# Patient Record
Sex: FEMALE | Race: WHITE | NOT HISPANIC OR LATINO | Employment: PART TIME | URBAN - METROPOLITAN AREA
[De-identification: names, ages, dates, MRNs, and addresses within clinical notes are randomized per-mention and may not be internally consistent; named-entity substitution may affect disease eponyms.]

---

## 2017-02-28 ENCOUNTER — ALLSCRIPTS OFFICE VISIT (OUTPATIENT)
Dept: OTHER | Facility: OTHER | Age: 27
End: 2017-02-28

## 2017-12-19 ENCOUNTER — ALLSCRIPTS OFFICE VISIT (OUTPATIENT)
Dept: OTHER | Facility: OTHER | Age: 27
End: 2017-12-19

## 2017-12-20 NOTE — PROGRESS NOTES
Assessment  1  Infection, upper respiratory (465 9) (J06 9)    Plan  Infection, upper respiratory    · Albuterol Sulfate (2 5 MG/3ML) 0 083% Inhalation Nebulization Solution; USE ONEVIAL IN NEBULIZER EVERY 4 TO 6 HOURS AS NEEDED FOR COUGH  ANDWHEEZE   · Azithromycin 250 MG Oral Tablet; TAKE 2 TABLETS BY MOUTH ON DAY 1, THENTAKE 1 TABLET BY MOUTH DAILY FOR 4 DAYS   · Cheratussin -10 MG/5ML Oral Syrup; TAKE 5 - 10 ML EVERY 4 TO 6HOURS AS NEEDED FOR COUGH   · PredniSONE 10 MG Oral Tablet; 4 tabs po qd for 2 days,then 3 tabs po qd for 2days,then 2 tabs poqd for 2 days,then 1 tabpo qd for 2 days    Discussion/Summary    Patient presents for evaluation of upper respiratory infection  Exam is consistent with acute sinusitis, she has been experiencing symptoms of mild bronchospasm relieved by albuterol via nebulizer  Will start patient on Zithromax Z-Eliseo, she will continue albuterol nebulizer as needed  She will use Robitussin with codeine as needed for cough  If her symptoms of chest tightness and or sinus pressure will not improve significantly in 48-72 hours, she will fill written a prescription for prednisone that I have provided for her today  The patient was counseled regarding instructions for management,-- impressions  Possible side effects of new medications were reviewed with the patient/guardian today  The treatment plan was reviewed with the patient/guardian  The patient/guardian understands and agrees with the treatment plan      Chief Complaint  Pt is here w/ c/o cough, runny nose, chest congestion since Friday  Pt states she had a fever on Saturday   All meds/allergies reviewed and updated      History of Present Illness  HPI: cold s/o for 4 days chest tightness - loser now, uses nebulizer with albuterol at home- helped quite a bit fever over the weekend, resolvedPatient is complaining of persistent sinus pressure, cough , nasal congestion and sore throat       Review of Systems   Constitutional: feeling poorly-- and-- feeling tired, but-- as noted in HPI   ENT: sore throat-- and-- nasal discharge, but-- as noted in HPI  Cardiovascular: no complaints of slow or fast heart rate, no chest pain, no palpitations, no leg claudication or lower extremity edema  Respiratory: shortness of breath-- and-- cough, but-- as noted in HPI  Neurological: no complaints of headache, no confusion, no numbness or tingling, no dizziness or fainting  Active Problems  1  Cold sore (054 9) (B00 1)   2  Infection, upper respiratory (465 9) (J06 9)   3  Urinary tract infection (599 0) (N39 0)    Past Medical History  Active Problems And Past Medical History Reviewed: The active problems and past medical history were reviewed and updated today  Family History  Mother    1  Family history of Depression   2  Family history of Hypertension   3  Family history of Living and Healthy  Father    4  Family history of Living and Healthy  Family History Reviewed: The family history was reviewed and updated today  Social History   · Never A Smoker  The social history was reviewed and updated today  Surgical History  1  History of Cholecystectomy  Surgical History Reviewed: The surgical history was reviewed and updated today  Current Meds   1  ValACYclovir HCl - 1 GM Oral Tablet; take 2 tablets twice a day as needed for cold sore; Therapy: [id number] to (Last Rx:[id number])  Requested for: [id number] Ordered    The medication list was reviewed and updated today  Allergies  1  No Known Drug Allergies    Vitals   Recorded: [id number] 11:24AM   Temperature 97 5 F   Heart Rate 80   Respiration 16   Systolic 757   Diastolic 70   Height 4 ft 8 5 in   Weight 101 lb 6 oz   BMI Calculated 22 33   BSA Calculated 1 34     Physical Exam   Constitutional  General appearance: No acute distress, well appearing and well nourished     Ears, Nose, Mouth, and Throat  Otoscopic examination: Tympanic membranes translucent with normal light reflex  Canals patent without erythema  Nasal mucosa, septum, and turbinates: Abnormal   The bilateral nasal mucosa was boggy-- and-- edematous  Oropharynx: Abnormal  -- cobblestone, erythema, postnasal drip , no exudate  Pulmonary  Respiratory effort: No increased work of breathing or signs of respiratory distress  Auscultation of lungs: Clear to auscultation  Cardiovascular  Auscultation of heart: Normal rate and rhythm, normal S1 and S2, without murmurs  Lymphatic  Palpation of lymph nodes in neck: Abnormal   bilateral submandibular node enlargement  Musculoskeletal  Gait and station: Normal    Neurologic  Cranial nerves: Cranial nerves 2-12 intact  Psychiatric  Orientation to person, place, and time: Normal    Mood and affect: Normal          Signatures   Electronically signed by :  SOO Lantigua ; Dec 19 2017  8:04PM EST                       (Author)

## 2018-01-15 VITALS
HEART RATE: 68 BPM | SYSTOLIC BLOOD PRESSURE: 102 MMHG | WEIGHT: 99.25 LBS | DIASTOLIC BLOOD PRESSURE: 72 MMHG | TEMPERATURE: 97.6 F | BODY MASS INDEX: 21.41 KG/M2 | HEIGHT: 57 IN

## 2018-01-22 VITALS
HEIGHT: 57 IN | SYSTOLIC BLOOD PRESSURE: 110 MMHG | BODY MASS INDEX: 21.87 KG/M2 | DIASTOLIC BLOOD PRESSURE: 70 MMHG | WEIGHT: 101.38 LBS | HEART RATE: 80 BPM | RESPIRATION RATE: 16 BRPM | TEMPERATURE: 97.5 F

## 2018-01-23 NOTE — MISCELLANEOUS
Message  Patient is excused from work 12/19/17-12/20/17  Return to work or school:   Daniel Manuel is under my professional care  She was seen in my office on 12/19/17             Signatures   Electronically signed by :  SOO Garcias ; Dec 19 2017  4:45PM EST                       (Author)

## 2018-03-22 ENCOUNTER — OFFICE VISIT (OUTPATIENT)
Dept: FAMILY MEDICINE CLINIC | Facility: CLINIC | Age: 28
End: 2018-03-22

## 2018-03-22 VITALS
HEART RATE: 80 BPM | HEIGHT: 57 IN | SYSTOLIC BLOOD PRESSURE: 98 MMHG | RESPIRATION RATE: 16 BRPM | TEMPERATURE: 97.5 F | WEIGHT: 103.6 LBS | DIASTOLIC BLOOD PRESSURE: 60 MMHG | BODY MASS INDEX: 22.35 KG/M2

## 2018-03-22 DIAGNOSIS — J01.90 ACUTE SINUSITIS, RECURRENCE NOT SPECIFIED, UNSPECIFIED LOCATION: Primary | ICD-10-CM

## 2018-03-22 PROCEDURE — 99213 OFFICE O/P EST LOW 20 MIN: CPT | Performed by: FAMILY MEDICINE

## 2018-03-22 RX ORDER — AMOXICILLIN 875 MG/1
875 TABLET, COATED ORAL 2 TIMES DAILY
Qty: 20 TABLET | Refills: 0 | Status: SHIPPED | OUTPATIENT
Start: 2018-03-22 | End: 2018-04-01

## 2018-03-22 RX ORDER — PREDNISONE 10 MG/1
TABLET ORAL
Qty: 20 TABLET | Refills: 0 | Status: SHIPPED | OUTPATIENT
Start: 2018-03-22 | End: 2018-07-19

## 2018-03-22 RX ORDER — ALBUTEROL SULFATE 2.5 MG/3ML
1 SOLUTION RESPIRATORY (INHALATION)
COMMUNITY
Start: 2017-12-19 | End: 2019-01-28 | Stop reason: SDUPTHER

## 2018-03-22 NOTE — PATIENT INSTRUCTIONS
Please discontinue DayQuil and NyQuil as you will be using prednisone to relieve you nasal congestion and headache   You may use Tylenol as needed but please do not take ibuprofen while you on prednisone   Please use amoxicillin 1 tablet twice a day for the next 7-10 days   Please gargle, rest, force fluids

## 2018-03-22 NOTE — PROGRESS NOTES
FAMILY PRACTICE OFFICE VISIT       NAME: Dori Chase  AGE: 28 y o  SEX: female       : 1990        MRN: [id number]    DATE: 3/22/2018  TIME: 10:13 PM    Assessment and Plan     Problem List Items Addressed This Visit     None      Visit Diagnoses     Acute sinusitis, recurrence not specified, unspecified location    -  Primary    Relevant Medications    amoxicillin (AMOXIL) 875 mg tablet    predniSONE 10 mg tablet       Patient presents for evaluation of acute sinusitis  She is experiencing significant maxillary pressure, cervical lymphadenopathy, sore throat and fatigue  Will treat with amoxicillin prednisone taper  Patient will use 40 mg daily for 2 days then 30 mg daily for 2 days then 20 mg daily for 2 days then 10 mg daily for 2 days  Advised rest, fluids, patient will contact me in a few days if her symptoms are not improving significantly  Patient Instructions    Please discontinue DayQuil and NyQuil as you will be using prednisone to relieve you nasal congestion and headache   You may use Tylenol as needed but please do not take ibuprofen while you on prednisone   Please use amoxicillin 1 tablet twice a day for the next 7-10 days   Please gargle, rest, force fluids          Chief Complaint     Chief Complaint   Patient presents with    Sore Throat    Nasal Congestion    Fever    Generalized Body Aches    Cough       History of Present Illness     ST  Since Tuesday,3/20   fever later that day    Now c/o  Wet/dry  Cough , along with significant and quite bothersome sinus pressure/worse at left maxillary sinus    No earache  No chest tightness  Tension HA    No fever  Used DayQuil and NyQuil   Mild cough, no chest tightness or wheezing  Sore Throat    Associated symptoms include congestion, coughing, ear pain and headaches  Pertinent negatives include no shortness of breath     Generalized Body Aches   Associated symptoms include congestion, ear pain, headaches, a sore throat, fatigue, a fever and coughing  Pertinent negatives include no shortness of breath or wheezing  Cough   Associated symptoms include ear pain, a fever, headaches and a sore throat  Pertinent negatives include no shortness of breath or wheezing  Review of Systems   Review of Systems   Constitutional: Positive for activity change, fatigue and fever  HENT: Positive for congestion, ear pain, sinus pressure and sore throat  Respiratory: Positive for cough  Negative for chest tightness, shortness of breath and wheezing  Cardiovascular: Negative  Gastrointestinal: Negative  Genitourinary: Negative  Musculoskeletal: Negative  Neurological: Positive for headaches  Hematological: Negative  Psychiatric/Behavioral: Negative  Active Problem List   There is no problem list on file for this patient  Past Medical History:  Past Medical History:   Diagnosis Date    Asthma        Past Surgical History:  History reviewed  No pertinent surgical history  Family History:  Family History   Problem Relation Age of Onset    Depression Mother     Hypertension Mother        Social History:  Social History     Social History    Marital status: Single     Spouse name: N/A    Number of children: N/A    Years of education: N/A     Occupational History    Not on file  Social History Main Topics    Smoking status: Former Smoker    Smokeless tobacco: Never Used    Alcohol use No    Drug use: No    Sexual activity: Not on file     Other Topics Concern    Not on file     Social History Narrative    No narrative on file       Objective     Vitals:    03/22/18 1639   BP: 98/60   Pulse: 80   Resp: 16   Temp: 97 5 °F (36 4 °C)     Wt Readings from Last 3 Encounters:   03/22/18 47 kg (103 lb 9 6 oz)   12/19/17 46 kg (101 lb 6 1 oz)   02/28/17 45 kg (99 lb 4 oz)       Physical Exam   Constitutional: She appears well-developed and well-nourished  She has a sickly appearance     HENT:   Head: Normocephalic and atraumatic  Right Ear: Tympanic membrane normal  Tympanic membrane is not erythematous  Left Ear: Tympanic membrane is not erythematous  A middle ear effusion is present  Nose: Mucosal edema present  Left sinus exhibits maxillary sinus tenderness  Eyes: Conjunctivae are normal    Cardiovascular: Normal rate and normal heart sounds  No murmur heard  Pulmonary/Chest: Effort normal and breath sounds normal  No respiratory distress  She has no wheezes  She has no rales  Musculoskeletal: Normal range of motion  Lymphadenopathy:     She has cervical adenopathy (submandibular)  Nursing note and vitals reviewed        Pertinent Laboratory/Diagnostic Studies:  No results found for: GLUCOSE, BUN, CREATININE, CALCIUM, NA, K, CO2, CL  No results found for: ALT, AST, GGT, ALKPHOS, BILITOT    No results found for: WBC, HGB, HCT, MCV, PLT    No results found for: TSH    No results found for: CHOL  No results found for: TRIG  No results found for: HDL  No results found for: LDLCALC  No results found for: HGBA1C    Results for orders placed or performed in visit on 10/08/15   URINALYSIS W/CULTURE & SENSI REFLEX (HISTORICAL)   Result Value Ref Range    Color, UA Dk Yellow     Clarity, UA Turbid     Specific [place], UA 1 020 1 003 - 1 030    pH, UA 6 0 4 5 - 8 0    Glucose, UA Negative Negative mg/dL    Ketones, UA Negative Negative mg/dL    Blood, UA Large (A) Negative    Protein,  (2+) (A) Negative mg/dL    Nitrite, UA Negative Negative    Bilirubin, UA Negative Negative    Urobilinogen, UA 0 2 0 2,1 0 E U /dl    Leukocytes, UA Large (A) Negative   URINE MICROSCOPIC (HISTORICAL)   Result Value Ref Range    RBC, UA Innumerable (A) None Seen,2-4 /hpf    WBC, UA Innumerable (A) None Seen,2-4 /hpf    Epithelial Cells None Seen None Seen /hpf    Bacteria, UA Innumerable (A) None Seen,Occasional /hpf   CULT - URINE (HISTORICAL)   Result Value Ref Range    Microbiology Specimen: Urine Clean Catch Microbiology Collected: 10/08/2015 07:17     Microbiology       Microbiology       Status: Final      Last Updated: 10/10/2015 09:09          Microbiology       Microbiology       Microbiology   CULT RSLT URINE (Final)     Microbiology     [place] Count >100,000 CFU/mL ISOLATE 1     Microbiology       Microbiology   ISOLATE 1 (Final)     Microbiology     Escherichia coli     Microbiology       Microbiology                           ISOLATE 1     Microbiology                           Escherichia coli     Microbiology     Antibiotics           -------------------     Microbiology                           mcg/mL   Interp     Microbiology     AMP/SULBACTAM         >16/8     R     Microbiology     AMPICILLIN            >16       R     Microbiology     AZTREONAM             <=8       S     Microbiology     CEFAZOLIN             <=8       S     Microbiology     CIPROFLOXACIN         <=1       S     Microbiology     GENTAMICIN            <=4       S     Microbiology     LEVOFLOXACIN          <=2       S     Microbiology     NITROFURANTOIN        <=32      S     Microbiology     PIPERACILLIN/TAZOBAC  <=16      S     Microbiology     TETRACYCLINE          <=4       S     Microbiology     TOBRAMYCIN            <=4       S     Microbiology     TRIMETHOPRIM/SULFA    <=2/38    S     Microbiology       Microbiology Interpretation Table:     Microbiology       Microbiology    Code   Description     Microbiology    ---    -----------     Microbiology    R      Resistant     Microbiology    S      Susceptible     Microbiology       Microbiology          No orders of the defined types were placed in this encounter        ALLERGIES:  No Known Allergies    Current Medications     Current Outpatient Prescriptions   Medication Sig Dispense Refill    albuterol (2 5 mg/3 mL) 0 083 % nebulizer solution Inhale 1 vial      amoxicillin (AMOXIL) 875 mg tablet Take 1 tablet (875 mg total) by mouth 2 (two) times a day for 10 days 20 tablet 0  predniSONE 10 mg tablet Take 40 mg once a day for 2 days then 30 mg once a day for 2 days then 20 mg once a day for 2 days then 10 mg once a day for 2 days 20 tablet 0     No current facility-administered medications for this visit  Health Maintenance   There are no preventive care reminders to display for this patient  There is no immunization history on file for this patient      Kaci Appiah MD

## 2018-04-02 DIAGNOSIS — Z00.00 HEALTHCARE MAINTENANCE: Primary | ICD-10-CM

## 2018-04-02 RX ORDER — VALACYCLOVIR HYDROCHLORIDE 1 G/1
TABLET, FILM COATED ORAL
Qty: 20 TABLET | Refills: 0 | Status: SHIPPED | OUTPATIENT
Start: 2018-04-02 | End: 2019-01-14 | Stop reason: SDUPTHER

## 2018-04-02 NOTE — TELEPHONE ENCOUNTER
Pt is requesting an rx for Valtrex 1 gram tablet  Take  2 tabs bid prn q=30   I do not see on med list  Please advise thanks

## 2018-04-02 NOTE — TELEPHONE ENCOUNTER
rx sent  I will send as 1 tablet t i d  for 7 days      She can use as 2 tablets twice a day for 1 day

## 2018-07-19 ENCOUNTER — OFFICE VISIT (OUTPATIENT)
Dept: FAMILY MEDICINE CLINIC | Facility: CLINIC | Age: 28
End: 2018-07-19
Payer: COMMERCIAL

## 2018-07-19 VITALS
WEIGHT: 99.8 LBS | RESPIRATION RATE: 16 BRPM | DIASTOLIC BLOOD PRESSURE: 66 MMHG | SYSTOLIC BLOOD PRESSURE: 94 MMHG | HEIGHT: 57 IN | TEMPERATURE: 97.9 F | BODY MASS INDEX: 21.53 KG/M2 | HEART RATE: 78 BPM

## 2018-07-19 DIAGNOSIS — G44.209 TENSION HEADACHE: ICD-10-CM

## 2018-07-19 DIAGNOSIS — N39.0 ACUTE UTI: Primary | ICD-10-CM

## 2018-07-19 PROCEDURE — 99213 OFFICE O/P EST LOW 20 MIN: CPT | Performed by: FAMILY MEDICINE

## 2018-07-19 RX ORDER — CIPROFLOXACIN 500 MG/1
500 TABLET, FILM COATED ORAL 2 TIMES DAILY
Qty: 14 TABLET | Refills: 0 | Status: SHIPPED | OUTPATIENT
Start: 2018-07-19 | End: 2018-07-26

## 2018-07-19 NOTE — PROGRESS NOTES
FAMILY PRACTICE OFFICE VISIT       NAME: Miriam Chase  AGE: 28 y o  SEX: female       : 1990        MRN: [id number]    DATE: 2018  TIME: 9:03 PM    Assessment and Plan     Problem List Items Addressed This Visit     None      Visit Diagnoses     Acute UTI    -  Primary    Relevant Medications    ciprofloxacin (CIPRO) 500 mg tablet    Tension headache            Patient presents for evaluation of recurrent urinary tract infection symptoms  She was seen at urgent care center with the onset of dysuria, mild gross hematuria and has started Bactrim DS  Antibiotic worked well for symptoms of UTI but unfortunately has triggered tension-type headaches  Patient has self discontinued antibiotic therapy a day ago  Her headaches have improved significantly but unfortunately she has redeveloped dysuria  She currently denies fever, flank pain or hematuria  Patient is afebrile  She has been using over-the-counter medications so we are not able to reliably test her urine today  At this time I advised her to discontinue Bactrim completely and switch therapy to Cipro 500 mg b i d   Patient will complete 7 days of antibiotic and will bring urinalysis for recheck  I strongly advised her to contact me in a few days if symptoms of dysuria or headaches are not improving  I advised patient to drink plenty of fluids and rest     There are no Patient Instructions on file for this visit  Recheck  U/a next week      Chief Complaint     Chief Complaint   Patient presents with    Urinary Tract Infection     Patient c/o pain and burning with urination and urinary frequency x's 1 wk  History of Present Illness     Patient presents for evaluation of dysuria and headaches  Remote history of kidney stones  Reportedly, she was seen at [address] [place] [address]  On  and has  started on bactrim due to UTI symptoms    Patient's u/a at [facility] reportedly revealed  Microscopic hematuria  Patient reports pinkish urine with onset of s/o  No flank pain or   No N/V  Patient states that Bactrim DS has  worked well for symptoms of UTI but reportedly patient developed headaches with start of antibiotic tfeverherapy  HA is occipital and frontal - tension type, no associated photophobia, phonophobia or dizziness  No visual changes  Patient has discontinued Bactrim 24 hr ago, her headaches have improved significantly while of medication  Unfortunately she Re developed symptoms of dysuria within past 24 hr   Patient denies symptoms of suprapubic pressure  Dysuria : yes  Incomplete emptying : Yes  She denies nausea, vomiting, fever or flank pain  Urinary Tract Infection    Associated symptoms include frequency  Pertinent negatives include no flank pain  Review of Systems   Review of Systems   Constitutional: Positive for fatigue and fever  Respiratory: Negative  Cardiovascular: Negative  Gastrointestinal: Negative  Genitourinary: Positive for dysuria and frequency  Negative for flank pain and pelvic pain  Neurological: Positive for headaches (Reportedly triggered by Bactrim therapy, improved while off antibiotic for 24 hr)  Hematological: Negative  Psychiatric/Behavioral: Negative  Active Problem List   There is no problem list on file for this patient  Past Medical History:  Past Medical History:   Diagnosis Date    Asthma        Past Surgical History:  Past Surgical History:   Procedure Laterality Date    CHOLECYSTECTOMY         Family History:  Family History   Problem Relation Age of Onset    Depression Mother     Hypertension Mother     No Known Problems Father        Social History:  Social History     Social History    Marital status: Single     Spouse name: N/A    Number of children: N/A    Years of education: N/A     Occupational History    Not on file       Social History Main Topics    Smoking status: Former Smoker    Smokeless tobacco: Never Used      Comment: never a smoker, per [employer]    Alcohol use No    Drug use: No    Sexual activity: Not on file     Other Topics Concern    Not on file     Social History Narrative    No narrative on file       Objective     Vitals:    07/19/18 1551   BP: 94/66   Pulse: 78   Resp: 16   Temp: 97 9 °F (36 6 °C)     Wt Readings from Last 3 Encounters:   07/19/18 45 3 kg (99 lb 12 8 oz)   03/22/18 47 kg (103 lb 9 6 oz)   12/19/17 46 kg (101 lb 6 1 oz)       Physical Exam   Constitutional: She is oriented to person, place, and time  She appears well-developed and well-nourished  HENT:   Head: Normocephalic and atraumatic  Eyes: Conjunctivae are normal    Neck: Neck supple  Carotid bruit is not present  No thyromegaly present  Cardiovascular: Normal rate, regular rhythm and normal heart sounds  Pulmonary/Chest: Effort normal and breath sounds normal    Abdominal: Soft  Normal appearance and bowel sounds are normal  She exhibits no distension and no abdominal bruit  There is tenderness in the right lower quadrant and suprapubic area  There is no rigidity, no rebound, no guarding and no CVA tenderness  Musculoskeletal: Normal range of motion  She exhibits no edema  Neurological: She is alert and oriented to person, place, and time  No cranial nerve deficit  Psychiatric: She has a normal mood and affect  Her behavior is normal    Nursing note and vitals reviewed        Pertinent Laboratory/Diagnostic Studies:  No results found for: GLUCOSE, BUN, CREATININE, CALCIUM, NA, K, CO2, CL  No results found for: ALT, AST, GGT, ALKPHOS, BILITOT    No results found for: WBC, HGB, HCT, MCV, PLT    No results found for: TSH    No results found for: CHOL  No results found for: TRIG  No results found for: HDL  No results found for: LDLCALC  No results found for: HGBA1C    Results for orders placed or performed in visit on 10/08/15   URINALYSIS W/CULTURE & SENSI REFLEX (HISTORICAL)   Result Value Ref Range    Color, UA Dk Yellow     Clarity, UA Turbid Specific [place], UA 1 020 1 003 - 1 030    pH, UA 6 0 4 5 - 8 0    Glucose, UA Negative Negative mg/dL    Ketones, UA Negative Negative mg/dL    Blood, UA Large (A) Negative    Protein,  (2+) (A) Negative mg/dL    Nitrite, UA Negative Negative    Bilirubin, UA Negative Negative    Urobilinogen, UA 0 2 0 2,1 0 E U /dl    Leukocytes, UA Large (A) Negative   URINE MICROSCOPIC (HISTORICAL)   Result Value Ref Range    RBC, UA Innumerable (A) None Seen,2-4 /hpf    WBC, UA Innumerable (A) None Seen,2-4 /hpf    Epithelial Cells None Seen None Seen /hpf    Bacteria, UA Innumerable (A) None Seen,Occasional /hpf   CULT - URINE (HISTORICAL)   Result Value Ref Range    Microbiology Specimen: Urine Clean Catch     Microbiology Collected: 10/08/2015 07:17     Microbiology       Microbiology       Status: Final      Last Updated: 10/10/2015 09:09          Microbiology       Microbiology       Microbiology   CULT RSLT URINE (Final)     Microbiology     [place] Count >100,000 CFU/mL ISOLATE 1     Microbiology       Microbiology   ISOLATE 1 (Final)     Microbiology     Escherichia coli     Microbiology       Microbiology                           ISOLATE 1     Microbiology                           Escherichia coli     Microbiology     Antibiotics           -------------------     Microbiology                           mcg/mL   Interp     Microbiology     AMP/SULBACTAM         >16/8     R     Microbiology     AMPICILLIN            >16       R     Microbiology     AZTREONAM             <=8       S     Microbiology     CEFAZOLIN             <=8       S     Microbiology     CIPROFLOXACIN         <=1       S     Microbiology     GENTAMICIN            <=4       S     Microbiology     LEVOFLOXACIN          <=2       S     Microbiology     NITROFURANTOIN        <=32      S     Microbiology     PIPERACILLIN/TAZOBAC  <=16      S     Microbiology     TETRACYCLINE          <=4       S     Microbiology     TOBRAMYCIN            <=4 S     Microbiology     TRIMETHOPRIM/SULFA    <=2/38    S     Microbiology       Microbiology Interpretation Table:     Microbiology       Microbiology    Code   Description     Microbiology    ---    -----------     Microbiology    R      Resistant     Microbiology    S      Susceptible     Microbiology       Microbiology          No orders of the defined types were placed in this encounter  ALLERGIES:  Allergies   Allergen Reactions    Bactrim [Sulfamethoxazole-Trimethoprim] Headache and Drowsiness       Current Medications     Current Outpatient Prescriptions   Medication Sig Dispense Refill    albuterol (2 5 mg/3 mL) 0 083 % nebulizer solution Inhale 1 vial      ciprofloxacin (CIPRO) 500 mg tablet Take 1 tablet (500 mg total) by mouth 2 (two) times a day for 7 days 14 tablet 0    valACYclovir (VALTREX) 1,000 mg tablet Take 1 tablet 3 times a day 20 tablet 0     No current facility-administered medications for this visit  Health Maintenance     Health Maintenance   Topic Date Due    HIV SCREENING  1990    Depression Screening PHQ-9  1990    PNEUMOCOCCAL POLYSACCHARIDE VACCINE AGE 2-64 HIGH RISK  08/02/1991    DTaP,Tdap,and Td Vaccines (1 - Tdap) 08/02/2010    INFLUENZA VACCINE  09/01/2018       There is no immunization history on file for this patient      Hudson Sacks, MD

## 2018-10-02 ENCOUNTER — OFFICE VISIT (OUTPATIENT)
Dept: FAMILY MEDICINE CLINIC | Facility: CLINIC | Age: 28
End: 2018-10-02
Payer: COMMERCIAL

## 2018-10-02 VITALS
SYSTOLIC BLOOD PRESSURE: 102 MMHG | HEART RATE: 62 BPM | RESPIRATION RATE: 16 BRPM | TEMPERATURE: 97.3 F | BODY MASS INDEX: 21.87 KG/M2 | DIASTOLIC BLOOD PRESSURE: 62 MMHG | WEIGHT: 101.4 LBS | HEIGHT: 57 IN

## 2018-10-02 DIAGNOSIS — Z00.00 HEALTHCARE MAINTENANCE: ICD-10-CM

## 2018-10-02 DIAGNOSIS — J30.9 ALLERGIC RHINITIS, UNSPECIFIED SEASONALITY, UNSPECIFIED TRIGGER: ICD-10-CM

## 2018-10-02 DIAGNOSIS — J01.90 ACUTE SINUSITIS, RECURRENCE NOT SPECIFIED, UNSPECIFIED LOCATION: Primary | ICD-10-CM

## 2018-10-02 PROCEDURE — 1036F TOBACCO NON-USER: CPT | Performed by: FAMILY MEDICINE

## 2018-10-02 PROCEDURE — 99213 OFFICE O/P EST LOW 20 MIN: CPT | Performed by: FAMILY MEDICINE

## 2018-10-02 RX ORDER — AMOXICILLIN AND CLAVULANATE POTASSIUM 875; 125 MG/1; MG/1
1 TABLET, FILM COATED ORAL
Qty: 20 TABLET | Refills: 0 | Status: SHIPPED | OUTPATIENT
Start: 2018-10-02 | End: 2018-10-12

## 2018-10-02 RX ORDER — CETIRIZINE HYDROCHLORIDE 10 MG/1
10 TABLET ORAL DAILY
Qty: 30 TABLET | Refills: 5 | Status: SHIPPED | OUTPATIENT
Start: 2018-10-02 | End: 2019-06-24 | Stop reason: SDUPTHER

## 2018-10-02 RX ORDER — FLUTICASONE PROPIONATE 50 MCG
2 SPRAY, SUSPENSION (ML) NASAL DAILY
Qty: 1 BOTTLE | Refills: 5 | Status: SHIPPED | OUTPATIENT
Start: 2018-10-02

## 2018-10-02 NOTE — PROGRESS NOTES
FAMILY PRACTICE OFFICE VISIT       NAME: Pio Chase  AGE: 33 y o  SEX: female       : 1990        MRN: [id number]        Assessment and Plan     Problem List Items Addressed This Visit     None      Visit Diagnoses     Acute sinusitis, recurrence not specified, unspecified location    -  Primary    Relevant Medications    amoxicillin-clavulanate (AUGMENTIN) 875-125 mg per tablet    Allergic rhinitis, unspecified seasonality, unspecified trigger        Relevant Medications    fluticasone (FLONASE) 50 mcg/act nasal spray    cetirizine (ZyrTEC) 10 mg tablet    Other Relevant Orders    [facility]        Relevant Orders    Ambulatory referral to Obstetrics / Gynecology       Patient presents for evaluation of acute sinusitis  Will treat with Augmentin 875 milligrams twice a day for 7-10 days, Flonase and Zyrtec  Advised gargling and nasal saline rinses  Allergic rhinitis  History of reactive airway disease  Referral to Allergy/immunology for complete evaluation  Health maintenance:  Referral to gyn    There are no Patient Instructions on file for this visit  Chief Complaint     Chief Complaint   Patient presents with   Vonda Henderson Like Symptoms     Pt is here for sore throat and post nasal drip 2 + days       History of Present Illness     Sore throat, thick copious PND, sinus congestion, ears feel pressure   chills, feels very tired  Chest is not tight, no cough or wheezing   Uses dayquil and Nyquil    Recurrent symptoms of PMS  Patient has tried birth control pills with side effects of mood swings  She is not on any contraceptives at present time  She is due for gyn exam     Recurrent symptoms of bronchitis/wheezing/allergies  Patient was never had formal allergy evaluation  Review of Systems   Review of Systems   Constitutional: Positive for activity change, chills and fatigue  Negative for fever     HENT: Positive for congestion, ear pain, postnasal drip and sore throat  Respiratory: Negative  Cardiovascular: Negative  Neurological: Negative  Active Problem List   There is no problem list on file for this patient  Past Medical History:  Past Medical History:   Diagnosis Date    Asthma        Past Surgical History:  Past Surgical History:   Procedure Laterality Date    CHOLECYSTECTOMY         Family History:  Family History   Problem Relation Age of Onset    Depression Mother     Hypertension Mother     No Known Problems Father        Social History:  Social History     Social History    Marital status: Single     Spouse name: N/A    Number of children: N/A    Years of education: N/A     Occupational History    Not on file  Social History Main Topics    Smoking status: Former Smoker    Smokeless tobacco: Never Used      Comment: never a smoker, per [employer]    Alcohol use Yes      Comment: social    Drug use: No    Sexual activity: Not on file     Other Topics Concern    Not on file     Social History Narrative    No narrative on file       Objective     Vitals:    10/02/18 1248   BP: 102/62   Pulse: 62   Resp: 16   Temp: (!) 97 3 °F (36 3 °C)     Wt Readings from Last 3 Encounters:   10/02/18 46 kg (101 lb 6 4 oz)   07/19/18 45 3 kg (99 lb 12 8 oz)   03/22/18 47 kg (103 lb 9 6 oz)       Physical Exam   Constitutional: She is oriented to person, place, and time  She appears well-developed and well-nourished  She has a sickly appearance  HENT:   Head: Normocephalic and atraumatic  Right Ear: Tympanic membrane normal    Left Ear: Tympanic membrane normal    Nose: Mucosal edema present  Mouth/Throat: Posterior oropharyngeal erythema present  No oropharyngeal exudate (Bright oropharyngeal erythema with copious green postnasal drip)  Eyes: Conjunctivae are normal    Neck: Neck supple  Cardiovascular: Normal rate, regular rhythm and normal heart sounds      Pulmonary/Chest: Effort normal and breath sounds normal  No respiratory distress  She has no wheezes  She has no rales  Lymphadenopathy:     She has cervical adenopathy (Submandibular)  Neurological: She is alert and oriented to person, place, and time  She has normal reflexes  No cranial nerve deficit  Psychiatric: She has a normal mood and affect  Her behavior is normal    Nursing note and vitals reviewed        Pertinent Laboratory/Diagnostic Studies:  No results found for: GLUCOSE, BUN, CREATININE, CALCIUM, NA, K, CO2, CL  No results found for: ALT, AST, GGT, ALKPHOS, BILITOT    No results found for: WBC, HGB, HCT, MCV, PLT    No results found for: TSH    No results found for: CHOL  No results found for: TRIG  No results found for: HDL  No results found for: LDLCALC  No results found for: HGBA1C    Results for orders placed or performed in visit on 10/08/15   URINALYSIS W/CULTURE & SENSI REFLEX (HISTORICAL)   Result Value Ref Range    Color, UA Dk Yellow     Clarity, UA Turbid     Specific [place], UA 1 020 1 003 - 1 030    pH, UA 6 0 4 5 - 8 0    Glucose, UA Negative Negative mg/dL    Ketones, UA Negative Negative mg/dL    Blood, UA Large (A) Negative    Protein,  (2+) (A) Negative mg/dL    Nitrite, UA Negative Negative    Bilirubin, UA Negative Negative    Urobilinogen, UA 0 2 0 2,1 0 E U /dl    Leukocytes, UA Large (A) Negative   URINE MICROSCOPIC (HISTORICAL)   Result Value Ref Range    RBC, UA Innumerable (A) None Seen,2-4 /hpf    WBC, UA Innumerable (A) None Seen,2-4 /hpf    Epithelial Cells None Seen None Seen /hpf    Bacteria, UA Innumerable (A) None Seen,Occasional /hpf   CULT - URINE (HISTORICAL)   Result Value Ref Range    Microbiology Specimen: Urine Clean Catch     Microbiology Collected: 10/08/2015 07:17     Microbiology       Microbiology       Status: Final      Last Updated: 10/10/2015 09:09          Microbiology       Microbiology       Microbiology   CULT RSLT URINE (Final)     Microbiology     [place] Count >100,000 CFU/mL ISOLATE 1     Microbiology       Microbiology   ISOLATE 1 (Final)     Microbiology     Escherichia coli     Microbiology       Microbiology                           ISOLATE 1     Microbiology                           Escherichia coli     Microbiology     Antibiotics           -------------------     Microbiology                           mcg/mL   Interp     Microbiology     AMP/SULBACTAM         >16/8     R     Microbiology     AMPICILLIN            >16       R     Microbiology     AZTREONAM             <=8       S     Microbiology     CEFAZOLIN             <=8       S     Microbiology     CIPROFLOXACIN         <=1       S     Microbiology     GENTAMICIN            <=4       S     Microbiology     LEVOFLOXACIN          <=2       S     Microbiology     NITROFURANTOIN        <=32      S     Microbiology     PIPERACILLIN/TAZOBAC  <=16      S     Microbiology     TETRACYCLINE          <=4       S     Microbiology     TOBRAMYCIN            <=4       S     Microbiology     TRIMETHOPRIM/SULFA    <=2/38    S     Microbiology       Microbiology Interpretation Table:     Microbiology       Microbiology    Code   Description     Microbiology    ---    -----------     Microbiology    R      Resistant     Microbiology    S      Susceptible     Microbiology       Microbiology          Orders Placed This Encounter   Procedures    Ambulatory referral to Allergy    Ambulatory referral to Obstetrics / Gynecology       ALLERGIES:  Allergies   Allergen Reactions    Bactrim [Sulfamethoxazole-Trimethoprim] Headache and Drowsiness       Current Medications     Current Outpatient Prescriptions   Medication Sig Dispense Refill    albuterol (2 5 mg/3 mL) 0 083 % nebulizer solution Inhale 1 vial      amoxicillin-clavulanate (AUGMENTIN) 875-125 mg per tablet Take 1 tablet by mouth 2 (two) times daily after meals for 10 days 20 tablet 0    cetirizine (ZyrTEC) 10 mg tablet Take 1 tablet (10 mg total) by mouth daily 30 tablet 5    fluticasone (FLONASE) 50 mcg/act nasal spray 2 sprays into each nostril daily 1 Bottle 5    valACYclovir (VALTREX) 1,000 mg tablet Take 1 tablet 3 times a day 20 tablet 0     No current facility-administered medications for this visit  Health Maintenance     Health Maintenance   Topic Date Due    DTaP,Tdap,and Td Vaccines (1 - Tdap) 08/02/2010    INFLUENZA VACCINE  09/01/2018    Depression Screening PHQ  10/02/2019       There is no immunization history on file for this patient      James Dash MD

## 2018-10-08 ENCOUNTER — TELEPHONE (OUTPATIENT)
Dept: FAMILY MEDICINE CLINIC | Facility: CLINIC | Age: 28
End: 2018-10-08

## 2018-10-08 DIAGNOSIS — J06.9 ACUTE URI: Primary | ICD-10-CM

## 2018-10-08 RX ORDER — METHYLPREDNISOLONE 4 MG/1
TABLET ORAL
Qty: 21 TABLET | Refills: 0 | Status: SHIPPED | OUTPATIENT
Start: 2018-10-08 | End: 2019-01-14

## 2018-10-08 NOTE — TELEPHONE ENCOUNTER
Please triage and let me know what her symptoms are? Does she need to be rechecked in the office?   Please offer follow-up with Fart[facility] is needed  Thank you

## 2018-10-08 NOTE — TELEPHONE ENCOUNTER
Please advise patient to continue on antibiotic, I actually prescribed her Augmentin  Will add Medrol Dosepak  I believe her symptoms right now mainly triggered by ongoing congestion add Medrol Dosepak should alleviate that    Prescription is being sent to the pharmacy now, thank you

## 2018-10-08 NOTE — TELEPHONE ENCOUNTER
Patient saw Dr Katlyn Madsen on Tues & medication is not working  She's not feeling any better  Wants to know if you can call a different medication into [facility] in [place]  Please call to advise

## 2018-10-08 NOTE — TELEPHONE ENCOUNTER
Spoke w/ pt and she states her sx's are post nasal drip , some congestion and raspy voice  Pt states that mucous is clear and she does not have a fever  Pt states thatshe thought she would start to feel better since taking amoxicillin but she monica not  Please advise

## 2019-01-14 ENCOUNTER — OFFICE VISIT (OUTPATIENT)
Dept: FAMILY MEDICINE CLINIC | Facility: CLINIC | Age: 29
End: 2019-01-14
Payer: COMMERCIAL

## 2019-01-14 VITALS
HEIGHT: 57 IN | RESPIRATION RATE: 14 BRPM | SYSTOLIC BLOOD PRESSURE: 98 MMHG | WEIGHT: 101.4 LBS | BODY MASS INDEX: 21.87 KG/M2 | TEMPERATURE: 97.7 F | DIASTOLIC BLOOD PRESSURE: 76 MMHG | HEART RATE: 72 BPM

## 2019-01-14 DIAGNOSIS — B00.1 RECURRENT COLD SORES: ICD-10-CM

## 2019-01-14 DIAGNOSIS — J01.91 ACUTE RECURRENT SINUSITIS, UNSPECIFIED LOCATION: Primary | ICD-10-CM

## 2019-01-14 PROCEDURE — 3008F BODY MASS INDEX DOCD: CPT | Performed by: FAMILY MEDICINE

## 2019-01-14 PROCEDURE — 99213 OFFICE O/P EST LOW 20 MIN: CPT | Performed by: FAMILY MEDICINE

## 2019-01-14 RX ORDER — IBUPROFEN 400 MG/1
TABLET ORAL
Qty: 60 TABLET | Refills: 1 | Status: SHIPPED | OUTPATIENT
Start: 2019-01-14

## 2019-01-14 RX ORDER — AMOXICILLIN 875 MG/1
875 TABLET, COATED ORAL 2 TIMES DAILY
Qty: 20 TABLET | Refills: 0 | Status: SHIPPED | OUTPATIENT
Start: 2019-01-14 | End: 2019-01-24

## 2019-01-14 RX ORDER — VALACYCLOVIR HYDROCHLORIDE 1 G/1
TABLET, FILM COATED ORAL
Qty: 4 TABLET | Refills: 3 | Status: SHIPPED | OUTPATIENT
Start: 2019-01-14 | End: 2020-03-14

## 2019-01-14 NOTE — PROGRESS NOTES
FAMILY PRACTICE OFFICE VISIT       NAME: Ani Chase  AGE: 33 y o  SEX: female       : 1990        MRN: [id number]        Assessment and Plan     Problem List Items Addressed This Visit     Recurrent cold sores    Relevant Medications    valACYclovir (VALTREX) 1,000 mg tablet    Acute recurrent sinusitis - Primary    Relevant Medications    amoxicillin (AMOXIL) 875 mg tablet    ibuprofen (MOTRIN) 400 mg tablet       Patient presents for evaluation of acute/recurrent sinusitis  Will treat with amoxicillin 875 mg twice a day for 10 days, patient will gargle, use nasal saline rinses and will try ibuprofen 400 mg every 6-8 hours as needed  She will contact me in a few days if her symptoms are not improving significantly  I advised her to start the regimen of twice a day nasal saline rinses to prevent recurrent sinus infections  Refill of Valtrex to be used as needed for cold sores, patient will keep on hand and take 2 gram  twice a day for 1 day p r n  [place] There are no Patient Instructions on file for this visit  M*Modal software was used to dictate this note  It may contain errors with dictating incorrect words/spelling  Please contact provider directly with any questions  Chief Complaint     Chief Complaint   Patient presents with    Sore Throat     x's 1+ days    Cough    Generalized Body Aches    Fatigue    Medication Refill     Valtrex       History of Present Illness     Patient with history of recurrent sinusitis presents for evaluation of cold symptoms  Patient woke up with severe sore throat this morning  Left ear feels blocked and pressured    Painful  Swallowing    Brown - yellow mucus expectoration   Patient took Mucinex this am    Bodyaches   no fever or chills    Sneezing  Yesterday   no cough     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 contacts  She is also requesting refill for Valtrex that she is using as needed for cold sores          Sore Throat Pertinent negatives include no coughing  Cough   Associated symptoms include chills, myalgias, postnasal drip and a sore throat  Pertinent negatives include no fever  Generalized Body Aches   Associated symptoms include a sore throat and fatigue  Pertinent negatives include no fever or coughing  Fatigue   Associated symptoms include arthralgias, chills, fatigue, myalgias and a sore throat  Pertinent negatives include no coughing or fever  Medication Refill   Associated symptoms include arthralgias, chills, fatigue, myalgias and a sore throat  Pertinent negatives include no coughing or fever  Review of Systems   Review of Systems   Constitutional: Positive for activity change, chills and fatigue  Negative for fever  HENT: Positive for postnasal drip and sore throat  Respiratory: Negative  Negative for cough  Cardiovascular: Negative  Gastrointestinal: Negative  Musculoskeletal: Positive for arthralgias and myalgias  Neurological: Negative  Active Problem List     Patient Active Problem List   Diagnosis    Recurrent cold sores    Acute recurrent sinusitis       Past Medical History:  Past Medical History:   Diagnosis Date    Asthma        Past Surgical History:  Past Surgical History:   Procedure Laterality Date    CHOLECYSTECTOMY         Family History:  Family History   Problem Relation Age of Onset    Depression Mother     Hypertension Mother     No Known Problems Father        Social History:  Social History     Social History    Marital status: Single     Spouse name: N/A    Number of children: N/A    Years of education: N/A     Occupational History    Not on file       Social History Main Topics    Smoking status: Former Smoker    Smokeless tobacco: Never Used      Comment: never a smoker, per [employer]    Alcohol use Yes      Comment: social    Drug use: No    Sexual activity: Not on file     Other Topics Concern    Not on file     Social History Narrative    No narrative on file       Objective     Vitals:    01/14/19 1104   BP: 98/76   Pulse: 72   Resp: 14   Temp: 97 7 °F (36 5 °C)   TempSrc: Tympanic   Weight: 46 kg (101 lb 6 4 oz)   Height: 4' 9" (1 448 m)     Wt Readings from Last 3 Encounters:   01/14/19 46 kg (101 lb 6 4 oz)   10/02/18 46 kg (101 lb 6 4 oz)   07/19/18 45 3 kg (99 lb 12 8 oz)       Physical Exam   Constitutional: She is oriented to person, place, and time  She appears well-developed and well-nourished  HENT:   Head: Normocephalic and atraumatic  Right Ear: Tympanic membrane normal    Left Ear: Tympanic membrane normal    Nose: Mucosal edema present  Mouth/Throat: Posterior oropharyngeal erythema present  No oropharyngeal exudate  Erythema, copious PND/ worse on the left side   Eyes: Conjunctivae are normal    Neck: Neck supple  Cardiovascular: Normal rate, regular rhythm and normal heart sounds  No murmur heard  Pulmonary/Chest: Effort normal and breath sounds normal  No respiratory distress  She has no wheezes  She has no rales  Lymphadenopathy:     She has cervical adenopathy (Submandibular bilaterally)  Neurological: She is alert and oriented to person, place, and time  She has normal reflexes  No cranial nerve deficit  Psychiatric: She has a normal mood and affect  Her behavior is normal    Nursing note and vitals reviewed        Pertinent Laboratory/Diagnostic Studies:  No results found for: GLUCOSE, BUN, CREATININE, CALCIUM, NA, K, CO2, CL  No results found for: ALT, AST, GGT, ALKPHOS, BILITOT    No results found for: WBC, HGB, HCT, MCV, PLT    No results found for: TSH    No results found for: CHOL  No results found for: TRIG  No results found for: HDL  No results found for: LDLCALC  No results found for: HGBA1C    Results for orders placed or performed in visit on 10/08/15   URINALYSIS W/CULTURE & SENSI REFLEX (HISTORICAL)   Result Value Ref Range    Color, UA Dk Yellow     Clarity, UA Turbid     Specific [place], UA 1 020 1 003 - 1 030    pH, UA 6 0 4 5 - 8 0    Glucose, UA Negative Negative mg/dL    Ketones, UA Negative Negative mg/dL    Blood, UA Large (A) Negative    Protein,  (2+) (A) Negative mg/dL    Nitrite, UA Negative Negative    Bilirubin, UA Negative Negative    Urobilinogen, UA 0 2 0 2,1 0 E U /dl    Leukocytes, UA Large (A) Negative   URINE MICROSCOPIC (HISTORICAL)   Result Value Ref Range    RBC, UA Innumerable (A) None Seen,2-4 /hpf    WBC, UA Innumerable (A) None Seen,2-4 /hpf    Epithelial Cells None Seen None Seen /hpf    Bacteria, UA Innumerable (A) None Seen,Occasional /hpf   CULT - URINE (HISTORICAL)   Result Value Ref Range    Microbiology Specimen: Urine Clean Catch     Microbiology Collected: 10/08/2015 07:17     Microbiology       Microbiology       Status: Final      Last Updated: 10/10/2015 09:09          Microbiology       Microbiology       Microbiology   CULT RSLT URINE (Final)     Microbiology     [place] Count >100,000 CFU/mL ISOLATE 1     Microbiology       Microbiology   ISOLATE 1 (Final)     Microbiology     Escherichia coli     Microbiology       Microbiology                           ISOLATE 1     Microbiology                           Escherichia coli     Microbiology     Antibiotics           -------------------     Microbiology                           mcg/mL   Interp     Microbiology     AMP/SULBACTAM         >16/8     R     Microbiology     AMPICILLIN            >16       R     Microbiology     AZTREONAM             <=8       S     Microbiology     CEFAZOLIN             <=8       S     Microbiology     CIPROFLOXACIN         <=1       S     Microbiology     GENTAMICIN            <=4       S     Microbiology     LEVOFLOXACIN          <=2       S     Microbiology     NITROFURANTOIN        <=32      S     Microbiology     PIPERACILLIN/TAZOBAC  <=16      S     Microbiology     TETRACYCLINE          <=4       S     Microbiology     TOBRAMYCIN            <=4       S Microbiology     TRIMETHOPRIM/SULFA    <=2/38    S     Microbiology       Microbiology Interpretation Table:     Microbiology       Microbiology    Code   Description     Microbiology    ---    -----------     Microbiology    R      Resistant     Microbiology    S      Susceptible     Microbiology       Microbiology          No orders of the defined types were placed in this encounter  ALLERGIES:  Allergies   Allergen Reactions    Bactrim [Sulfamethoxazole-Trimethoprim] Headache and Drowsiness       Current Medications     Current Outpatient Prescriptions   Medication Sig Dispense Refill    albuterol (2 5 mg/3 mL) 0 083 % nebulizer solution Inhale 1 vial      cetirizine (ZyrTEC) 10 mg tablet Take 1 tablet (10 mg total) by mouth daily 30 tablet 5    fluticasone (FLONASE) 50 mcg/act nasal spray 2 sprays into each nostril daily 1 Bottle 5    amoxicillin (AMOXIL) 875 mg tablet Take 1 tablet (875 mg total) by mouth 2 (two) times a day for 10 days 20 tablet 0    ibuprofen (MOTRIN) 400 mg tablet Take 1 tablet every 6-8 hours after food as needed for headaches, pain, menstrual cramps 60 tablet 1    valACYclovir (VALTREX) 1,000 mg tablet Take 2 tabs  BID for 1 day 4 tablet 3     No current facility-administered medications for this visit  Health Maintenance     Health Maintenance   Topic Date Due    INFLUENZA VACCINE  02/14/2019 (Originally 7/1/2018)    DTaP,Tdap,and Td Vaccines (1 - Tdap) 02/14/2019 (Originally 8/2/2010)    Depression Screening PHQ  10/02/2019       There is no immunization history on file for this patient      Kristy Liz MD

## 2019-01-15 PROBLEM — B00.1 RECURRENT COLD SORES: Status: ACTIVE | Noted: 2019-01-15

## 2019-01-15 PROBLEM — J01.91 ACUTE RECURRENT SINUSITIS: Status: ACTIVE | Noted: 2019-01-15

## 2019-01-28 ENCOUNTER — TELEPHONE (OUTPATIENT)
Dept: FAMILY MEDICINE CLINIC | Facility: CLINIC | Age: 29
End: 2019-01-28

## 2019-01-28 DIAGNOSIS — R05.9 COUGH IN ADULT PATIENT: Primary | ICD-10-CM

## 2019-01-28 RX ORDER — AZITHROMYCIN 250 MG/1
TABLET, FILM COATED ORAL
Qty: 6 TABLET | Refills: 0 | Status: SHIPPED | OUTPATIENT
Start: 2019-01-28 | End: 2019-02-01

## 2019-01-28 RX ORDER — ALBUTEROL SULFATE 2.5 MG/3ML
2.5 SOLUTION RESPIRATORY (INHALATION) EVERY 4 HOURS PRN
Qty: 120 VIAL | Refills: 2 | Status: SHIPPED | OUTPATIENT
Start: 2019-01-28 | End: 2021-07-01 | Stop reason: SDUPTHER

## 2019-01-28 NOTE — TELEPHONE ENCOUNTER
Patient states that she is unable to make an appointment for tomorrow due to work  She states that the cough is not that bad but she is having some tightness and would like to treat it before it gets worse  She would like to know if you could call in a prescription for the albuterol nebulizer solution to her local pharmacy  Please advise

## 2019-01-28 NOTE — TELEPHONE ENCOUNTER
Please offer an appointment tomorrow for re-evaluation, patient is prone for bronchitis and I do need to listen to her chest   Thank you  15 minutes short visit is okay

## 2019-01-28 NOTE — TELEPHONE ENCOUNTER
I will send prescription for Z-Eliseo and albuterol for nebulizer, if she is not better in a few days, I will have to recheck her  Hope she feels better soon    Thank you

## 2019-02-06 ENCOUNTER — TELEPHONE (OUTPATIENT)
Dept: FAMILY MEDICINE CLINIC | Facility: CLINIC | Age: 29
End: 2019-02-06

## 2019-02-06 DIAGNOSIS — N76.0 ACUTE VAGINITIS: Primary | ICD-10-CM

## 2019-02-06 RX ORDER — FLUCONAZOLE 150 MG/1
150 TABLET ORAL ONCE
Qty: 1 TABLET | Refills: 0 | Status: SHIPPED | OUTPATIENT
Start: 2019-02-06 | End: 2019-02-06

## 2019-02-06 NOTE — TELEPHONE ENCOUNTER
Patient states she has developed a yeast infection from the antibiotics  Would like to know if Dr Doreen Beckett can call in something for her in to [facility] in [place]  Please call to advise

## 2019-02-25 ENCOUNTER — HOSPITAL ENCOUNTER (EMERGENCY)
Facility: HOSPITAL | Age: 29
Discharge: HOME | End: 2019-02-25
Attending: EMERGENCY MEDICINE | Admitting: EMERGENCY MEDICINE
Payer: COMMERCIAL

## 2019-02-25 VITALS
OXYGEN SATURATION: 95 % | SYSTOLIC BLOOD PRESSURE: 164 MMHG | TEMPERATURE: 98 F | BODY MASS INDEX: 20.77 KG/M2 | HEART RATE: 67 BPM | RESPIRATION RATE: 18 BRPM | WEIGHT: 96 LBS | DIASTOLIC BLOOD PRESSURE: 108 MMHG

## 2019-02-25 DIAGNOSIS — H10.31 ACUTE CONJUNCTIVITIS OF RIGHT EYE, UNSPECIFIED ACUTE CONJUNCTIVITIS TYPE: Primary | ICD-10-CM

## 2019-02-25 PROCEDURE — 99283 EMERGENCY DEPT VISIT LOW MDM: CPT

## 2019-02-25 RX ORDER — TOBRAMYCIN 3 MG/ML
2 SOLUTION/ DROPS OPHTHALMIC ONCE
Status: COMPLETED | OUTPATIENT
Start: 2019-02-25 | End: 2019-02-25

## 2019-02-25 RX ORDER — TETRACAINE HYDROCHLORIDE 5 MG/ML
2 SOLUTION OPHTHALMIC ONCE
Status: COMPLETED | OUTPATIENT
Start: 2019-02-25 | End: 2019-02-25

## 2019-02-25 RX ADMIN — TOBRAMYCIN 2 DROP: 3 SOLUTION/ DROPS OPHTHALMIC at 05:05

## 2019-02-25 RX ADMIN — GENTAMICIN SULFATE 0.5 INCH: 3 OINTMENT OPHTHALMIC at 05:19

## 2019-02-25 RX ADMIN — FLUORESCEIN SODIUM 1 STRIP: 0.6 STRIP OPHTHALMIC at 04:00

## 2019-02-25 RX ADMIN — TETRACAINE HYDROCHLORIDE 2 DROP: 5 SOLUTION OPHTHALMIC at 04:00

## 2019-02-25 NOTE — ED PROVIDER NOTES
History  Chief Complaint   Patient presents with    Eye Pain     patient states she believes her right contact is stuck in her eye  Patient is a 50-year-old female presenting the emergency room stating around midnight she knows that she was unable to get her right contact out of her on and states she thinks it has gone up below her upper eyelid, states she has been having uncomfortable feeling especially when blinking her eyes since then  States her mother tried to get out with a Q-tip and was not able to  It is an uncomfortable feeling in her right eye as well as inability to remove contact patient denies any other symptoms  Denies fever, night sweats, chills, headache, dizziness, lightheadedness, sore throat, cough, chest pain, shortness of breath, abdominal pain, nausea/vomiting, diarrhea/constipation, visual disturbances, photophobia, double vision  History provided by:  Patient   used: No    Eye Pain   Associated symptoms: no abdominal pain, no chest pain, no congestion, no cough, no diarrhea, no fatigue, no fever, no headaches, no myalgias, no nausea, no rash, no rhinorrhea, no shortness of breath, no sore throat, no vomiting and no wheezing        Prior to Admission Medications   Prescriptions Last Dose Informant Patient Reported? Taking?    albuterol (2 5 mg/3 mL) 0 083 % nebulizer solution   No No   Sig: Take 1 vial (2 5 mg total) by nebulization every 4 (four) hours as needed for wheezing or shortness of breath (Cough)   cetirizine (ZyrTEC) 10 mg tablet   No No   Sig: Take 1 tablet (10 mg total) by mouth daily   fluticasone (FLONASE) 50 mcg/act nasal spray   No No   Si sprays into each nostril daily   ibuprofen (MOTRIN) 400 mg tablet   No No   Sig: Take 1 tablet every 6-8 hours after food as needed for headaches, pain, menstrual cramps   valACYclovir (VALTREX) 1,000 mg tablet   No No   Sig: Take 2 tabs  BID for 1 day      Facility-Administered Medications: None Past Medical History:   Diagnosis Date    Asthma        Past Surgical History:   Procedure Laterality Date    CHOLECYSTECTOMY         Family History   Problem Relation Age of Onset    Depression Mother     Hypertension Mother     No Known Problems Father      I have reviewed and agree with the history as documented  Social History     Tobacco Use    Smoking status: Former Smoker    Smokeless tobacco: Never Used    Tobacco comment: never a smoker, per [employer]   Substance Use Topics    Alcohol use: Yes     Comment: social    Drug use: No        Review of Systems   Constitutional: Negative for activity change, appetite change, chills, diaphoresis, fatigue and fever  HENT: Negative for congestion, nosebleeds, postnasal drip, rhinorrhea, sinus pressure, sinus pain, sneezing and sore throat  Eyes: Positive for pain and redness  Negative for visual disturbance  Respiratory: Negative for apnea, cough, chest tightness, shortness of breath, wheezing and stridor  Cardiovascular: Negative for chest pain, palpitations and leg swelling  Gastrointestinal: Negative for abdominal distention, abdominal pain, blood in stool, constipation, diarrhea, nausea and vomiting  Genitourinary: Negative for difficulty urinating, dysuria, flank pain, frequency, hematuria and urgency  Musculoskeletal: Negative for arthralgias, back pain, gait problem, joint swelling, myalgias, neck pain and neck stiffness  Skin: Negative for color change, pallor, rash and wound  Neurological: Negative for dizziness, syncope, facial asymmetry, weakness, light-headedness, numbness and headaches  Psychiatric/Behavioral: Negative for confusion and decreased concentration  The patient is not nervous/anxious          Physical Exam  ED Triage Vitals   Temperature Pulse Respirations Blood Pressure SpO2   02/25/19 0340 02/25/19 0340 02/25/19 0340 02/25/19 0343 02/25/19 0340   98 °F (36 7 °C) 67 18 (!) 164/108 95 %      Temp Source Heart Rate Source Patient Position - Orthostatic VS BP Location FiO2 (%)   02/25/19 0340 02/25/19 0340 02/25/19 0340 02/25/19 0340 --   Oral Monitor Sitting Left arm       Pain Score       02/25/19 0340       6           Orthostatic Vital Signs  Vitals:    02/25/19 0340 02/25/19 0343   BP:  (!) 164/108   Pulse: 67    Patient Position - Orthostatic VS: Sitting        Physical Exam   Constitutional: She is oriented to person, place, and time  She appears well-developed and well-nourished  No distress  HENT:   Head: Normocephalic and atraumatic  Right Ear: External ear normal    Left Ear: External ear normal    Nose: Nose normal    Eyes: Pupils are equal, round, and reactive to light  EOM and lids are normal  Right eye exhibits no discharge and no exudate  Left eye exhibits no discharge and no exudate  Right conjunctiva is injected  Right conjunctiva has no hemorrhage  Left conjunctiva is not injected  Left conjunctiva has no hemorrhage  No scleral icterus  Right eye exhibits normal extraocular motion and no nystagmus  Left eye exhibits normal extraocular motion and no nystagmus  Right pupil is round and reactive  Left pupil is round and reactive  Pupils are equal    Neck: Normal range of motion  Neck supple  No JVD present  No tracheal deviation present  Cardiovascular: Normal rate, regular rhythm, normal heart sounds and intact distal pulses  Exam reveals no gallop and no friction rub  No murmur heard  Pulmonary/Chest: Effort normal and breath sounds normal  No stridor  No respiratory distress  She has no wheezes  She has no rales  Abdominal: Soft  Bowel sounds are normal  She exhibits no distension and no mass  There is no tenderness  There is no guarding  Musculoskeletal: Normal range of motion  She exhibits no edema, tenderness or deformity  Neurological: She is alert and oriented to person, place, and time  No cranial nerve deficit or sensory deficit  She exhibits normal muscle tone     Skin: Skin is warm and dry  No rash noted  She is not diaphoretic  No erythema  No pallor  Psychiatric: She has a normal mood and affect  Her behavior is normal  Judgment and thought content normal    Nursing note and vitals reviewed  ED Medications  Medications   gentamicin (GENTAK) 0 3 % ophthalmic ointment 0 5 inch (has no administration in time range)   tobramycin (TOBREX) 0 3 % ophthalmic solution 2 drop (has no administration in time range)   fluorescein sodium sterile ophthalmic strip 1 strip (1 strip Right Eye Given 2/25/19 0400)   tetracaine 0 5 % ophthalmic solution 2 drop (2 drops Right Eye Given 2/25/19 0400)       Diagnostic Studies  Results Reviewed     None                 No orders to display         Procedures  Procedures      Phone Consults  ED Phone Contact    ED Course                               MDM  Number of Diagnoses or Management Options  Acute conjunctivitis of right eye, unspecified acute conjunctivitis type: minor  Risk of Complications, Morbidity, and/or Mortality  General comments: On examination of patient's eye no contact seen with lid eversion  Slit lamp exam w/ fluorescin shows no obvious corneal abrasion        Disposition  Final diagnoses:   Acute conjunctivitis of right eye, unspecified acute conjunctivitis type     Time reflects when diagnosis was documented in both MDM as applicable and the Disposition within this note     Time User Action Codes Description Comment    2/25/2019  4:57 AM Zohaib Cuevas Add [H10 31] Acute conjunctivitis of right eye, unspecified acute conjunctivitis type       ED Disposition     ED Disposition Condition Date/Time Comment    Discharge Stable Mon Feb 25, 2019  4:57 AM [address] discharge to home/self care              Follow-up Information     Follow up With Specialties Details Why Contact Info Additional Information    Optometrist  Go in 3 days       [address] [place] [address] Emergency Department Emergency Medicine  As needed, If symptoms worsen [address] [place] [address] ED, [address], [id number]          Patient's Medications   Discharge Prescriptions    No medications on file     No discharge procedures on file  ED Provider  Attending physically available and evaluated Sujatha Pryor I managed the patient along with the ED Attending      Electronically Signed by         Sanjana Gill DO  02/25/19 3905

## 2019-02-25 NOTE — ED ATTENDING ATTESTATION
Goyo Leo MD, saw and evaluated the patient  I have discussed the patient with the resident/non-physician practitioner and agree with the resident's/non-physician practitioner's findings, Plan of Care, and MDM as documented in the resident's/non-physician practitioner's note, except where noted  All available labs and Radiology studies were reviewed  At this point I agree with the current assessment done in the Emergency Department  I have conducted an independent evaluation of this patient including a focused history of:    Emergency Department Note- Roshan Hayes 33 y o  female MRN: [id number]    Unit/Bed#: ED 02 Encounter: [id number]    Roshan Hayes is a 33 y o  female who presents with   Chief Complaint   Patient presents with    Eye Pain     patient states she believes her right contact is stuck in her eye  History of Present Illness   HPI:  Roshan Hayes is a 33 y o  female who presents for evaluation of:  Foreign body sensation right eye  Patient believes that contact lens got caught under her right eyelid  Her right irritated now  Review of Systems   Constitutional: Negative for fatigue and fever  Eyes: Positive for pain and redness  All other systems reviewed and are negative        Historical Information   Past Medical History:   Diagnosis Date    Asthma      Past Surgical History:   Procedure Laterality Date    CHOLECYSTECTOMY       Social History   Social History     Substance and Sexual Activity   Alcohol Use Yes    Comment: social     Social History     Substance and Sexual Activity   Drug Use No     Social History     Tobacco Use   Smoking Status Former Smoker   Smokeless Tobacco Never Used   Tobacco Comment    never a smoker, per [employer]     Family History: non-contributory    Meds/Allergies   all medications and allergies reviewed  Allergies   Allergen Reactions    Bactrim [Sulfamethoxazole-Trimethoprim] Headache and Drowsiness       Objective First Vitals:   Blood Pressure: (!) 164/108 (19 0343)  Pulse: 67 (19 0340)  Temperature: 98 °F (36 7 °C) (19 0340)  Temp Source: Oral (19 0340)  Respirations: 18 (19 0340)  Weight - Scale: 43 5 kg (96 lb) (19 0340)  SpO2: 95 % (19)    Current Vitals:   Blood Pressure: (!) 164/108 (19 0343)  Pulse: 67 (19 0340)  Temperature: 98 °F (36 7 °C) (19 0340)  Temp Source: Oral (19 0340)  Respirations: 18 (19 0340)  Weight - Scale: 43 5 kg (96 lb) (19 0340)  SpO2: 95 % (190)    No intake or output data in the 24 hours ending 19 0402    Invasive Devices          None          Physical Exam   Constitutional: She is oriented to person, place, and time  She appears well-developed and well-nourished  HENT:   Head: Normocephalic and atraumatic  Eyes: Pupils are equal, round, and reactive to light  EOM and lids are normal  Lids are everted and swept, no foreign bodies found  Right eye exhibits chemosis  Right eye exhibits no discharge  No foreign body present in the right eye  Left eye exhibits no chemosis  Right conjunctiva is injected  Right conjunctiva has no hemorrhage  Neck: Normal range of motion  Neck supple  Musculoskeletal: Normal range of motion  She exhibits no deformity  Neurological: She is alert and oriented to person, place, and time  Skin: Skin is warm and dry  Capillary refill takes less than 2 seconds  Psychiatric: She has a normal mood and affect  Her behavior is normal  Judgment and thought content normal    Nursing note and vitals reviewed  Medical Decision Makin  Right eye irritation and FB sensation: fluoroscein exam to r/o corneal abrasion    No results found for this or any previous visit (from the past 36 hour(s))  No orders to display         Portions of the record may have been created with voice recognition software   Occasional wrong word or "sound a like" substitutions may have occurred due to the inherent limitations of voice recognition software  Read the chart carefully and recognize, using context, where substitutions have occurred

## 2019-06-24 ENCOUNTER — OFFICE VISIT (OUTPATIENT)
Dept: FAMILY MEDICINE CLINIC | Facility: CLINIC | Age: 29
End: 2019-06-24
Payer: COMMERCIAL

## 2019-06-24 VITALS
RESPIRATION RATE: 16 BRPM | SYSTOLIC BLOOD PRESSURE: 120 MMHG | BODY MASS INDEX: 21.36 KG/M2 | TEMPERATURE: 97.1 F | OXYGEN SATURATION: 98 % | HEART RATE: 97 BPM | WEIGHT: 99 LBS | HEIGHT: 57 IN | DIASTOLIC BLOOD PRESSURE: 72 MMHG

## 2019-06-24 DIAGNOSIS — J01.91 ACUTE RECURRENT SINUSITIS, UNSPECIFIED LOCATION: Primary | ICD-10-CM

## 2019-06-24 DIAGNOSIS — J30.9 ALLERGIC RHINITIS, UNSPECIFIED SEASONALITY, UNSPECIFIED TRIGGER: ICD-10-CM

## 2019-06-24 PROCEDURE — 3008F BODY MASS INDEX DOCD: CPT | Performed by: FAMILY MEDICINE

## 2019-06-24 PROCEDURE — 99213 OFFICE O/P EST LOW 20 MIN: CPT | Performed by: FAMILY MEDICINE

## 2019-06-24 RX ORDER — CETIRIZINE HYDROCHLORIDE 10 MG/1
10 TABLET ORAL DAILY
Qty: 90 TABLET | Refills: 3 | Status: SHIPPED | OUTPATIENT
Start: 2019-06-24

## 2019-06-24 RX ORDER — MONTELUKAST SODIUM 10 MG/1
10 TABLET ORAL
Qty: 90 TABLET | Refills: 3 | Status: SHIPPED | OUTPATIENT
Start: 2019-06-24

## 2019-06-24 RX ORDER — METHYLPREDNISOLONE 4 MG/1
TABLET ORAL
Qty: 21 EACH | Refills: 0 | Status: SHIPPED | OUTPATIENT
Start: 2019-06-24 | End: 2021-01-11 | Stop reason: ALTCHOICE

## 2019-06-24 RX ORDER — AZITHROMYCIN 250 MG/1
TABLET, FILM COATED ORAL
Qty: 6 TABLET | Refills: 0 | Status: SHIPPED | OUTPATIENT
Start: 2019-06-24 | End: 2019-06-28

## 2020-03-14 DIAGNOSIS — B00.1 RECURRENT COLD SORES: ICD-10-CM

## 2020-03-14 RX ORDER — VALACYCLOVIR HYDROCHLORIDE 1 G/1
TABLET, FILM COATED ORAL
Qty: 4 TABLET | Refills: 3 | Status: SHIPPED | OUTPATIENT
Start: 2020-03-14 | End: 2021-07-01 | Stop reason: SDUPTHER

## 2020-04-03 ENCOUNTER — TELEPHONE (OUTPATIENT)
Dept: FAMILY MEDICINE CLINIC | Facility: CLINIC | Age: 30
End: 2020-04-03

## 2020-04-03 DIAGNOSIS — N39.0 ACUTE UTI: Primary | ICD-10-CM

## 2020-04-03 RX ORDER — NITROFURANTOIN 25; 75 MG/1; MG/1
100 CAPSULE ORAL 2 TIMES DAILY
Qty: 10 CAPSULE | Refills: 0 | Status: SHIPPED | OUTPATIENT
Start: 2020-04-03 | End: 2020-04-08

## 2021-01-11 ENCOUNTER — TELEMEDICINE (OUTPATIENT)
Dept: FAMILY MEDICINE CLINIC | Facility: CLINIC | Age: 31
End: 2021-01-11
Payer: COMMERCIAL

## 2021-01-11 DIAGNOSIS — J01.00 ACUTE NON-RECURRENT MAXILLARY SINUSITIS: Primary | ICD-10-CM

## 2021-01-11 DIAGNOSIS — Z03.818 ENCOUNTER FOR OBSERVATION FOR SUSPECTED EXPOSURE TO OTHER BIOLOGICAL AGENTS RULED OUT: ICD-10-CM

## 2021-01-11 DIAGNOSIS — B34.9 VIRAL INFECTION, UNSPECIFIED: ICD-10-CM

## 2021-01-11 PROCEDURE — 1036F TOBACCO NON-USER: CPT | Performed by: NURSE PRACTITIONER

## 2021-01-11 PROCEDURE — 99214 OFFICE O/P EST MOD 30 MIN: CPT | Performed by: NURSE PRACTITIONER

## 2021-01-11 RX ORDER — AZITHROMYCIN 250 MG/1
TABLET, FILM COATED ORAL
Qty: 6 TABLET | Refills: 0 | Status: SHIPPED | OUTPATIENT
Start: 2021-01-11 | End: 2021-01-16

## 2021-01-11 NOTE — PROGRESS NOTES
COVID-19 Virtual Visit     Assessment/Plan:    Problem List Items Addressed This Visit     None      Visit Diagnoses     Acute non-recurrent maxillary sinusitis    -  Primary    Relevant Medications    azithromycin (ZITHROMAX) 250 mg tablet    Encounter for observation for suspected exposure to other biological agents ruled out        Relevant Orders    Novel Coronavirus (COVID-19), PCR [facility] - Collected at [facility]    Viral infection, unspecified        Relevant Orders    Novel Coronavirus (COVID-19), PCR [facility] - Collected at [facility] or [facility]         Disposition:     I referred patient to one of our centralized sites for a COVID-19 swab  Discussed with patient I would like her to obtain COVID-19 swab, directions provided regarding centralized swabbing location  She is reluctant but will consider  We discussed risk of transmission of COVID-19 virus, this why it is important to know if she does or does not have COVID-19 in order to take proper isolation precautions  She verbalizes understanding  She will stay home and self isolate  She does not work again until 1/16  I have prescribed a Z-Eliseo, which patient states is usually effective in preventing bronchitis/exacerbation of asthma when upper respiratory/sinus symptoms begin  If she should feel worse, or if symptoms are not significantly improving over the next 3-4 days, she will call  I have spent 8 minutes directly with the patient  Encounter provider Fartun Chavez    Provider located at [address] [place] [address]    Recent Visits  No visits were found meeting these conditions     Showing recent visits within past 7 days and meeting all other requirements     Today's Visits  Date Type Provider Dept   01/11/21 Telemedicine Fartun Baugh, [address] [place] [address] today's visits and meeting all other requirements     Future Appointments  No visits were found meeting these conditions  Showing future appointments within next 150 days and meeting all other requirements      This virtual check-in was done via [employer] and patient was informed that this is not a secure, HIPAA-compliant platform  She agrees to proceed  Patient agrees to participate in a virtual check in via telephone or video visit instead of presenting to the office to address urgent/immediate medical needs  Patient is aware this is a billable service  After connecting through [facility], the patient was identified by name and date of birth  Cachorro Carpio was informed that this was a telemedicine visit and that the exam was being conducted confidentially over secure lines  My office door was closed  No one else was in the room  Cachorro Carpio acknowledged consent and understanding of privacy and security of the telemedicine visit  I informed the patient that I have reviewed her record in Epic and presented the opportunity for her to ask any questions regarding the visit today  The patient agreed to participate  Subjective:   Cachorro Carpio is a 31 y o  female who is concerned about COVID-19  Patient's symptoms include fatigue, nasal congestion, rhinorrhea, sore throat and cough (occasional)  Patient denies fever, chills, anosmia, loss of taste, shortness of breath, chest tightness, abdominal pain, nausea, vomiting, diarrhea, myalgias and headaches       Date of symptom onset: 1/9/2021    Exposure:   Contact with a person who is under investigation (PUI) for or who is positive for COVID-19 within the last 14 days?: No    Hospitalized recently for fever and/or lower respiratory symptoms?: No      Currently a healthcare worker that is involved in direct patient care?: No      Works in a special setting where the risk of COVID-19 transmission may be high? (this may include long-term care, correctional and nursing home facilities; homeless shelters; assisted-living facilities and group homes ): No      Resident in a special setting where the risk of COVID-19 transmission may be high? (this may include long-term care, correctional and half-way facilities; homeless shelters; assisted-living facilities and group homes ): No       Right side of face is swollen from sinus pressure  Sinus pressure is located in maxillary sinuses bilaterally  Notes she usually gets a sinus infection twice per year  States it often goes to her chest, has a history of asthma  She does have an albuterol inhaler and nebulizer if needed  She is currently not using Flonase, Zyrtec, or montelukast    Using Mucinex  Lives with her boyfriend in 2 roommates  She is a , sees a lot of people  She does wear her mask at all times at work  No results found for: Pillo Silva, DARWIN, Mel[address]  Past Medical History:   Diagnosis Date    Asthma      Past Surgical History:   Procedure Laterality Date    CHOLECYSTECTOMY       Current Outpatient Medications   Medication Sig Dispense Refill    albuterol (2 5 mg/3 mL) 0 083 % nebulizer solution Take 1 vial (2 5 mg total) by nebulization every 4 (four) hours as needed for wheezing or shortness of breath (Cough) 120 vial 2    azithromycin (ZITHROMAX) 250 mg tablet Take 2 tablets on day 1 and then take 1 tablet on days 2-5  6 tablet 0    cetirizine (ZyrTEC) 10 mg tablet Take 1 tablet (10 mg total) by mouth daily 90 tablet 3    fluticasone (FLONASE) 50 mcg/act nasal spray 2 sprays into each nostril daily 1 Bottle 5    ibuprofen (MOTRIN) 400 mg tablet Take 1 tablet every 6-8 hours after food as needed for headaches, pain, menstrual cramps 60 tablet 1    montelukast (SINGULAIR) 10 mg tablet Take 1 tablet (10 mg total) by mouth daily at bedtime 90 tablet 3    valACYclovir (VALTREX) 1,000 mg tablet TAKE 2 TABLETS BY MOUTH TWO TIMES A DAY FOR 1 DAYS 4 tablet 3     No current facility-administered medications for this visit        Allergies   Allergen Reactions    Bactrim [Sulfamethoxazole-Trimethoprim] Headache and Drowsiness       Review of Systems   Constitutional: Positive for fatigue  Negative for chills and fever  HENT: Positive for congestion, rhinorrhea and sore throat  Respiratory: Positive for cough (occasional)  Negative for chest tightness and shortness of breath  Gastrointestinal: Negative for abdominal pain, diarrhea, nausea and vomiting  Musculoskeletal: Negative for myalgias  Neurological: Negative for headaches  Objective: There were no vitals filed for this visit  Physical Exam  Constitutional:       General: She is not in acute distress  Appearance: Normal appearance  She is not ill-appearing, toxic-appearing or diaphoretic  HENT:      Head: Normocephalic and atraumatic  Nose:      Right Sinus: Maxillary sinus tenderness present  Left Sinus: Maxillary sinus tenderness present  Comments:   Maxillary sinus tenderness with patient self palpation  Mild left facial swelling in maxillary region  No redness or skin color change  Eyes:      Conjunctiva/sclera: Conjunctivae normal    Pulmonary:      Effort: Pulmonary effort is normal  No respiratory distress  Comments:  No cough  Neurological:      Mental Status: She is alert  Psychiatric:         Mood and Affect: Mood normal        VIRTUAL VISIT DISCLAIMER    Mara Riley acknowledges that she has consented to an online visit or consultation  She understands that the online visit is based solely on information provided by her, and that, in the absence of a face-to-face physical evaluation by the physician, the diagnosis she receives is both limited and provisional in terms of accuracy and completeness  This is not intended to replace a full medical face-to-face evaluation by the physician  Mara Riley understands and accepts these terms

## 2021-04-12 ENCOUNTER — OFFICE VISIT (OUTPATIENT)
Dept: FAMILY MEDICINE CLINIC | Facility: CLINIC | Age: 31
End: 2021-04-12
Payer: COMMERCIAL

## 2021-04-12 VITALS
WEIGHT: 111.2 LBS | OXYGEN SATURATION: 99 % | SYSTOLIC BLOOD PRESSURE: 106 MMHG | DIASTOLIC BLOOD PRESSURE: 66 MMHG | BODY MASS INDEX: 23.99 KG/M2 | RESPIRATION RATE: 14 BRPM | HEIGHT: 57 IN | TEMPERATURE: 97.8 F | HEART RATE: 90 BPM

## 2021-04-12 DIAGNOSIS — F41.1 GAD (GENERALIZED ANXIETY DISORDER): Primary | ICD-10-CM

## 2021-04-12 DIAGNOSIS — F41.0 PANIC ATTACKS: ICD-10-CM

## 2021-04-12 PROCEDURE — 3008F BODY MASS INDEX DOCD: CPT | Performed by: FAMILY MEDICINE

## 2021-04-12 PROCEDURE — 1036F TOBACCO NON-USER: CPT | Performed by: FAMILY MEDICINE

## 2021-04-12 PROCEDURE — 99214 OFFICE O/P EST MOD 30 MIN: CPT | Performed by: FAMILY MEDICINE

## 2021-04-12 PROCEDURE — 3725F SCREEN DEPRESSION PERFORMED: CPT | Performed by: FAMILY MEDICINE

## 2021-04-12 RX ORDER — ALPRAZOLAM 0.25 MG/1
0.25 TABLET ORAL 3 TIMES DAILY PRN
Qty: 30 TABLET | Refills: 1 | Status: SHIPPED | OUTPATIENT
Start: 2021-04-12

## 2021-04-12 RX ORDER — CITALOPRAM 20 MG/1
TABLET ORAL
Qty: 30 TABLET | Refills: 3 | Status: SHIPPED | OUTPATIENT
Start: 2021-04-12

## 2021-04-12 NOTE — PROGRESS NOTES
FAMILY PRACTICE OFFICE VISIT       NAME: Julia Lopez  AGE: 31 y o  SEX: female       : 1990        MRN: [id number]        Assessment and Plan     Problem List Items Addressed This Visit        Other    GIAN (generalized anxiety disorder) - Primary    Relevant Medications    citalopram (CeleXA) 20 mg tablet    ALPRAZolam (XANAX) 0 25 mg tablet      Other Visit Diagnoses     Panic attacks        Relevant Medications    ALPRAZolam (XANAX) 0 25 mg tablet      Patient presents for evaluation of severe anxiety disorder with panic attacks  Patient denies symptoms of depression  We had long discussion about her symptoms and treatment choices  I explained patient that p r n  medications such as any benzodiazepine will cause symptoms of tolerance and dependence if used as all therapy  I counseled patient about SSRI therapy, mechanism of action, benefits and possible side effects  After long discussion, patient is agreeable to start citalopram   She will take 10 mg once a day for the 1st 10 days and then will increase dose to 20 mg daily  I will prescribe alprazolam 0 25 mg for patient to be used as needed  I explained patient that citalopram may take up to 4-6 weeks to work and alprazolam should control symptoms of anxiety and panic attacks in the interim  We discussed importance of healthy diet, exercise, sleep hygiene  Patient will contact me with any questions or concerns  I recommended COVID-19 vaccination  Patient will schedule follow-up in 4-6 weeks  I have spent 35 minutes with Patient  today in which greater than 50% of this time was spent in counseling/coordination of care regarding Risks and benefits of tx options, Intructions for management, Patient and family education, Importance of tx compliance and Impressions  There are no Patient Instructions on file for this visit  Discussed with the patient and all questioned fully answered  She will call me if any problems arise  [email or web address] software was used to dictate this note  It may contain errors with dictating incorrect words/spelling  Please contact provider directly with any questions  Chief Complaint     Chief Complaint   Patient presents with    Anxiety       History of Present Illness     PHQ-9 Depression Screening    PHQ-9:   Frequency of the following problems over the past two weeks:      Little interest or pleasure in doing things: 0 - not at all  Feeling down, depressed, or hopeless: 0 - not at all  PHQ-2 Score: 0     GIAN-7 Flowsheet Screening      Most Recent Value   Over the last two weeks, how often have you been bothered by the following   problems? Feeling nervous, anxious, or on edge  2   Not being able to stop or control worrying  2   Worrying too much about different things  2   Trouble relaxing   3   Being so restless that it's hard to sit still  3   Becoming easily annoyed or irritable   3   Feeling afraid as if something awful might happen  3   How difficult have these problems made it for you to do your work, take   care of things at home, or get along with other people? Somewhat   difficult   GIAN Score   18       Patient presents for evaluation of anxiety symptoms  She reports longstanding history of generalized anxiety which has been getting progressively worse within past few months  Patient's states that she is constantly over thinking and her mind would not stop  She sleeps poorly  Patient states that she is keeping busy throughout the day trying to do a lot of unnecessary chores such as cleaning or cooking just to keep herself occupied and busy  Patient feels that small little details can make her worried and anxious for no obvious reason  She reports having good days and bad days  She may not experience symptoms of anxiety for 1-2 days but then they reoccur and are  quite severe  Anxiety symptoms are quite debilitating and restricting    Patient has significant anxiety of driving highways even as a passenger  Patient denies symptoms of social anxiety such as being in the grocery store or public places  Patient's support include mother, boyfriend, friends  Patient feels that anxiety symptoms are affecting her personal relationship, she reports increased symptoms of irritability as protective mechanism to her anxiety  Patient reports symptoms of panic attacks went her heart feels racing and she feels the lump in her throat  Patient denies symptoms of depression or sadness  Patient feels that she has been anxious all her life but symptoms are getting worse as she is getting older  Patient believes that current COVID-19 pandemic might have a infected her symptoms  Patient states that she is not interested in daily medication but prefers to take medication is needed for her anxiety symptoms  She has not received COVID-19 vaccination and I strongly advised her to schedule it  Anxiety  Symptoms include nervous/anxious behavior and palpitations  Patient reports no suicidal ideas  Review of Systems   Review of Systems   Constitutional: Negative  HENT: Negative  Eyes: Negative  Respiratory: Negative  Cardiovascular: Positive for palpitations  Gastrointestinal: Negative  Endocrine: Negative  Genitourinary: Negative  Musculoskeletal: Negative  Skin: Negative  Allergic/Immunologic: Negative  Neurological: Negative  Hematological: Negative  Psychiatric/Behavioral: Positive for sleep disturbance  Negative for dysphoric mood, self-injury and suicidal ideas  The patient is nervous/anxious          Active Problem List     Patient Active Problem List   Diagnosis    Recurrent cold sores    Acute recurrent sinusitis    GIAN (generalized anxiety disorder)       Past Medical History:  Past Medical History:   Diagnosis Date    Asthma        Past Surgical History:  Past Surgical History:   Procedure Laterality Date    CHOLECYSTECTOMY Family History:  Family History   Problem Relation Age of Onset   [facility] Depression Mother     Hypertension Mother     No Known Problems Father        Social History:  Social History     Socioeconomic History    Marital status: Single     Spouse name: Not on file    Number of children: Not on file    Years of education: Not on file    Highest education level: Not on file   Occupational History    Not on file   Social Needs    Financial resource strain: Not on file    Food insecurity     Worry: Not on file     Inability: Not on file    Transportation needs     Medical: Not on file     Non-medical: Not on file   Tobacco Use    Smoking status: Former Smoker    Smokeless tobacco: Never Used    Tobacco comment: never a smoker, per [employer]   Substance and Sexual Activity    Alcohol use: Yes     Comment: social    Drug use: No    Sexual activity: Not on file   Lifestyle    Physical activity     Days per week: Not on file     Minutes per session: Not on file    Stress: Not on file   Relationships    Social connections     Talks on phone: Not on file     Gets together: Not on file     Attends [employer] service: Not on file     Active member of club or organization: Not on file     Attends meetings of clubs or organizations: Not on file     Relationship status: Not on file    Intimate partner violence     Fear of current or ex partner: Not on file     Emotionally abused: Not on file     Physically abused: Not on file     Forced sexual activity: Not on file   Other Topics Concern    Not on file   Social History Narrative    Not on file           Objective     Vitals:    04/12/21 0834   BP: 106/66   BP Location: Left arm   Patient Position: Sitting   Cuff Size: Adult   Pulse: 90   Resp: 14   Temp: 97 8 °F (36 6 °C)   TempSrc: Temporal   SpO2: 99%   Weight: 50 4 kg (111 lb 3 2 oz)   Height: 4' 9" (1 448 m)     Wt Readings from Last 3 Encounters:   04/12/21 50 4 kg (111 lb 3 2 oz)   06/24/19 44 9 kg (99 lb)   02/25/19 43 5 kg (96 lb)       Physical Exam  Vitals signs and nursing note reviewed  Constitutional:       Appearance: She is well-developed  HENT:      Head: Normocephalic and atraumatic  Eyes:      Conjunctiva/sclera: Conjunctivae normal    Neck:      Musculoskeletal: Neck supple  Thyroid: No thyromegaly  Vascular: No carotid bruit  Cardiovascular:      Rate and Rhythm: Normal rate and regular rhythm  Heart sounds: Normal heart sounds  No murmur  Pulmonary:      Effort: Pulmonary effort is normal  No respiratory distress  Breath sounds: Normal breath sounds  No wheezing  Abdominal:      General: There is no abdominal bruit  Musculoskeletal: Normal range of motion  Neurological:      Mental Status: She is alert and oriented to person, place, and time  Cranial Nerves: No cranial nerve deficit  Coordination: Coordination normal    Psychiatric:         Attention and Perception: Attention normal          Mood and Affect: Affect normal  Mood is anxious  Mood is not depressed  Speech: Speech is rapid and pressured  Behavior: Behavior normal          Thought Content:  Thought content normal          Pertinent Laboratory/Diagnostic Studies:  No results found for: GLUCOSE, BUN, CREATININE, CALCIUM, NA, K, CO2, CL  No results found for: ALT, AST, GGT, ALKPHOS, BILITOT    No results found for: WBC, HGB, HCT, MCV, PLT    No results found for: TSH    No results found for: CHOL  No results found for: TRIG  No results found for: HDL  No results found for: LDLCALC  No results found for: HGBA1C    Results for orders placed or performed in visit on 10/08/15   URINALYSIS W/CULTURE & SENSI REFLEX (HISTORICAL)   Result Value Ref Range    Color, UA Dk Yellow     Clarity, UA Turbid     Specific [place], UA 1 020 1 003 - 1 030    pH, UA 6 0 4 5 - 8 0    Glucose, UA Negative Negative mg/dL    Ketones, UA Negative Negative mg/dL    Blood, UA Large (A) Negative    Protein,  (2+) (A) Negative mg/dL    Nitrite, UA Negative Negative    Bilirubin, UA Negative Negative    Urobilinogen, UA 0 2 0 2,1 0 E U /dl    Leukocytes, UA Large (A) Negative   URINE MICROSCOPIC (HISTORICAL)   Result Value Ref Range    RBC, UA Innumerable (A) None Seen,2-4 /hpf    WBC, UA Innumerable (A) None Seen,2-4 /hpf    Epithelial Cells None Seen None Seen /hpf    Bacteria, UA Innumerable (A) None Seen,Occasional /hpf   CULT - URINE (HISTORICAL)   Result Value Ref Range    Microbiology Specimen: Urine Clean Catch     Microbiology Collected: 10/08/2015 07:17     Microbiology       Microbiology       Status: Final      Last Updated: 10/10/2015 09:09          Microbiology       Microbiology       Microbiology   CULT RSLT URINE (Final)     Microbiology     [place] Count >100,000 CFU/mL ISOLATE 1     Microbiology       Microbiology   ISOLATE 1 (Final)     Microbiology     Escherichia coli     Microbiology       Microbiology                           ISOLATE 1     Microbiology                           Escherichia coli     Microbiology     Antibiotics           -------------------     Microbiology                           mcg/mL   Interp     Microbiology     AMP/SULBACTAM         >16/8     R     Microbiology     AMPICILLIN            >16       R     Microbiology     AZTREONAM             <=8       S     Microbiology     CEFAZOLIN             <=8       S     Microbiology     CIPROFLOXACIN         <=1       S     Microbiology     GENTAMICIN            <=4       S     Microbiology     LEVOFLOXACIN          <=2       S     Microbiology     NITROFURANTOIN        <=32      S     Microbiology     PIPERACILLIN/TAZOBAC  <=16      S     Microbiology     TETRACYCLINE          <=4       S     Microbiology     TOBRAMYCIN            <=4       S     Microbiology     TRIMETHOPRIM/SULFA    <=2/38    S     Microbiology       Microbiology Interpretation Table:     Microbiology       Microbiology    Code   Description     Microbiology ---    -----------     Microbiology    R      Resistant     Microbiology    S      Susceptible     Microbiology       Microbiology          No orders of the defined types were placed in this encounter  ALLERGIES:  Allergies   Allergen Reactions    Bactrim [Sulfamethoxazole-Trimethoprim] Headache and Drowsiness       Current Medications     Current Outpatient Medications   Medication Sig Dispense Refill    albuterol (2 5 mg/3 mL) 0 083 % nebulizer solution Take 1 vial (2 5 mg total) by nebulization every 4 (four) hours as needed for wheezing or shortness of breath (Cough) 120 vial 2    cetirizine (ZyrTEC) 10 mg tablet Take 1 tablet (10 mg total) by mouth daily 90 tablet 3    ibuprofen (MOTRIN) 400 mg tablet Take 1 tablet every 6-8 hours after food as needed for headaches, pain, menstrual cramps 60 tablet 1    valACYclovir (VALTREX) 1,000 mg tablet TAKE 2 TABLETS BY MOUTH TWO TIMES A DAY FOR 1 DAYS 4 tablet 3    ALPRAZolam (XANAX) 0 25 mg tablet Take 1 tablet (0 25 mg total) by mouth 3 (three) times a day as needed for anxiety or sleep 30 tablet 1    citalopram (CeleXA) 20 mg tablet Take half a tablet once a day for 10 days, then take 1 tablet daily 30 tablet 3    fluticasone (FLONASE) 50 mcg/act nasal spray 2 sprays into each nostril daily (Patient not taking: Reported on 4/12/2021) 1 Bottle 5    montelukast (SINGULAIR) 10 mg tablet Take 1 tablet (10 mg total) by mouth daily at bedtime (Patient not taking: Reported on 4/12/2021) 90 tablet 3     No current facility-administered medications for this visit  There are no discontinued medications      Health Maintenance     Health Maintenance   Topic Date Due    HIV Screening  Never done    COVID-19 Vaccine (1) Never done    Annual Physical  Never done    DTaP,Tdap,and Td Vaccines (1 - Tdap) Never done    Cervical Cancer Screening  Never done    Influenza Vaccine (1) 06/30/2021 (Originally 9/1/2020)    Depression Screening PHQ  04/12/2022    BMI: Adult  04/12/2022    Pneumococcal Vaccine: Pediatrics (0 to 5 Years) and At-Risk Patients (6 to 59 Years)  Aged Out    HIB Vaccine  Aged Out    Hepatitis B Vaccine  Aged Out    IPV Vaccine  Aged Out    Hepatitis A Vaccine  Aged Out    Meningococcal ACWY Vaccine  Aged Out    HPV Vaccine  Aged Out         There is no immunization history on file for this patient      Virginia Borjas MD

## 2021-04-17 PROBLEM — F41.0 PANIC ATTACKS: Status: ACTIVE | Noted: 2021-04-17

## 2021-06-28 ENCOUNTER — OFFICE VISIT (OUTPATIENT)
Dept: URGENT CARE | Facility: CLINIC | Age: 31
End: 2021-06-28
Payer: COMMERCIAL

## 2021-06-28 VITALS
HEART RATE: 78 BPM | BODY MASS INDEX: 22.65 KG/M2 | RESPIRATION RATE: 20 BRPM | OXYGEN SATURATION: 98 % | WEIGHT: 105 LBS | TEMPERATURE: 98.4 F | HEIGHT: 57 IN

## 2021-06-28 DIAGNOSIS — J06.9 ACUTE URI: Primary | ICD-10-CM

## 2021-06-28 DIAGNOSIS — J02.9 SORE THROAT: ICD-10-CM

## 2021-06-28 LAB — S PYO AG THROAT QL: NEGATIVE

## 2021-06-28 PROCEDURE — 99203 OFFICE O/P NEW LOW 30 MIN: CPT | Performed by: PHYSICIAN ASSISTANT

## 2021-06-28 PROCEDURE — 87880 STREP A ASSAY W/OPTIC: CPT | Performed by: PHYSICIAN ASSISTANT

## 2021-06-28 RX ORDER — AZITHROMYCIN 250 MG/1
TABLET, FILM COATED ORAL
Qty: 6 TABLET | Refills: 0 | Status: SHIPPED | OUTPATIENT
Start: 2021-06-28 | End: 2021-07-01

## 2021-06-28 NOTE — PATIENT INSTRUCTIONS
Upper Respiratory Infection   WHAT YOU NEED TO KNOW:   An upper respiratory infection is also called a cold  It can affect your nose, throat, ears, and sinuses  Cold symptoms are usually worst for the first 3 to 5 days  Most people get better in 7 to 14 days  You may continue to cough for 2 to 3 weeks  Colds are caused by viruses and do not get better with antibiotics  DISCHARGE INSTRUCTIONS:   Call your local emergency number (911 in the [address] [facility]) if:   · You have chest pain or trouble breathing  Return to the emergency department if:   · You have a fever over 102ºF (39ºC)  Call your doctor if:   · You have a low fever  · Your sore throat gets worse or you see white or yellow spots in your throat  · Your symptoms get worse after 3 to 5 days or are not better in 14 days  · You have a rash anywhere on your skin  · You have large, tender lumps in your neck  · You have thick, green, or yellow drainage from your nose  · You cough up thick yellow, green, or bloody mucus  · You have a bad earache  · You have questions or concerns about your condition or care  Medicines: You may need any of the following:  · Decongestants  help reduce nasal congestion and help you breathe more easily  If you take decongestant pills, they may make you feel restless or cause problems with your sleep  Do not use decongestant sprays for more than a few days  · Cough suppressants  help reduce coughing  Ask your healthcare provider which type of cough medicine is best for you  · NSAIDs , such as ibuprofen, help decrease swelling, pain, and fever  NSAIDs can cause stomach bleeding or kidney problems in certain people  If you take blood thinner medicine, always ask your healthcare provider if NSAIDs are safe for you  Always read the medicine label and follow directions  · Acetaminophen  decreases pain and fever  It is available without a doctor's order  Ask how much to take and how often to take it  Follow directions  Read the labels of all other medicines you are using to see if they also contain acetaminophen, or ask your doctor or pharmacist  Acetaminophen can cause liver damage if not taken correctly  Do not use more than 4 grams (4,000 milligrams) total of acetaminophen in one day  · Take your medicine as directed  Contact your healthcare provider if you think your medicine is not helping or if you have side effects  Tell him or her if you are allergic to any medicine  Keep a list of the medicines, vitamins, and herbs you take  Include the amounts, and when and why you take them  Bring the list or the pill bottles to follow-up visits  Carry your medicine list with you in case of an emergency  Self-care:   · Rest as much as possible  Slowly start to do more each day  · Drink more liquids as directed  Liquids will help thin and loosen mucus so you can cough it up  Liquids will also help prevent dehydration  Liquids that help prevent dehydration include water, fruit juice, and broth  Do not drink liquids that contain caffeine  Caffeine can increase your risk for dehydration  Ask your healthcare provider how much liquid to drink each day  · Soothe a sore throat  Gargle with warm salt water  Make salt water by dissolving ¼ teaspoon salt in 1 cup warm water  You may also suck on hard candy or throat lozenges  You may use a sore throat spray  · Use a humidifier or vaporizer  Use a cool mist humidifier or a vaporizer to increase air moisture in your home  This may make it easier for you to breathe and help decrease your cough  · Use saline nasal drops as directed  These help relieve congestion  · Apply petroleum-based jelly around the outside of your nostrils  This can decrease irritation from blowing your nose  · Do not smoke  Nicotine and other chemicals in cigarettes and cigars can make your symptoms worse  They can also cause infections such as bronchitis or pneumonia   Ask your healthcare provider for information if you currently smoke and need help to quit  E-cigarettes or smokeless tobacco still contain nicotine  Talk to your healthcare provider before you use these products  Prevent a cold:   · Wash your hands often  Use soap and water every time you wash your hands  Rub your soapy hands together, lacing your fingers  Use the fingers of one hand to scrub under the nails of the other hand  Wash for at least 20 seconds  Rinse with warm, running water for several seconds  Then dry your hands  Use germ-killing gel if soap and water are not available  Do not touch your eyes or mouth without washing your hands first          · Cover a sneeze or cough  Use a tissue that covers your mouth and nose  Put the used tissue in the trash right away  Use the bend of your arm if a tissue is not available  Wash your hands well with soap and water or use a hand   Do not stand close to anyone who is sneezing or coughing  · Try to stay away from others while you are sick  This is especially important during the first 2 to 3 days when the virus is more easily spread  Wait until a fever, cough, or other symptoms are gone before you return to work or other regular activities  · Do not share items while you are sick  This includes food, drinks, eating utensils, and dishes  Follow up with your doctor as directed:  Write down your questions so you remember to ask them during your visits  © Copyright [address] Information is for End User's use only and may not be sold, redistributed or otherwise used for commercial purposes  All illustrations and images included in CareNotes® are the copyrighted property of A D [employer] M [employer]  or [facility] Silva   The above information is an  only  It is not intended as medical advice for individual conditions or treatments   Talk to your doctor, nurse or pharmacist before following any medical regimen to see if it is safe and effective for you

## 2021-07-01 ENCOUNTER — OFFICE VISIT (OUTPATIENT)
Dept: FAMILY MEDICINE CLINIC | Facility: CLINIC | Age: 31
End: 2021-07-01
Payer: COMMERCIAL

## 2021-07-01 VITALS — WEIGHT: 105 LBS | HEIGHT: 57 IN | BODY MASS INDEX: 22.65 KG/M2

## 2021-07-01 DIAGNOSIS — J01.41 ACUTE RECURRENT PANSINUSITIS: Primary | ICD-10-CM

## 2021-07-01 DIAGNOSIS — B00.1 RECURRENT COLD SORES: ICD-10-CM

## 2021-07-01 DIAGNOSIS — B37.3 VAGINA, CANDIDIASIS: ICD-10-CM

## 2021-07-01 DIAGNOSIS — J98.01 BRONCHOSPASM: ICD-10-CM

## 2021-07-01 PROCEDURE — 99213 OFFICE O/P EST LOW 20 MIN: CPT | Performed by: FAMILY MEDICINE

## 2021-07-01 PROCEDURE — 1036F TOBACCO NON-USER: CPT | Performed by: FAMILY MEDICINE

## 2021-07-01 PROCEDURE — 3008F BODY MASS INDEX DOCD: CPT | Performed by: FAMILY MEDICINE

## 2021-07-01 RX ORDER — METHYLPREDNISOLONE 4 MG/1
TABLET ORAL
Qty: 21 EACH | Refills: 0 | Status: SHIPPED | OUTPATIENT
Start: 2021-07-01

## 2021-07-01 RX ORDER — FLUCONAZOLE 150 MG/1
150 TABLET ORAL ONCE
Qty: 1 TABLET | Refills: 0 | Status: SHIPPED | OUTPATIENT
Start: 2021-07-01 | End: 2021-07-05

## 2021-07-01 RX ORDER — VALACYCLOVIR HYDROCHLORIDE 1 G/1
TABLET, FILM COATED ORAL
Qty: 4 TABLET | Refills: 3 | Status: SHIPPED | OUTPATIENT
Start: 2021-07-01 | End: 2022-06-15 | Stop reason: SDUPTHER

## 2021-07-01 RX ORDER — CEFPROZIL 500 MG/1
500 TABLET, FILM COATED ORAL 2 TIMES DAILY
Qty: 14 TABLET | Refills: 0 | Status: SHIPPED | OUTPATIENT
Start: 2021-07-01 | End: 2021-07-08

## 2021-07-01 RX ORDER — ALBUTEROL SULFATE 2.5 MG/3ML
2.5 SOLUTION RESPIRATORY (INHALATION) EVERY 4 HOURS PRN
Qty: 270 ML | Refills: 2 | Status: SHIPPED | OUTPATIENT
Start: 2021-07-01

## 2021-07-01 NOTE — PROGRESS NOTES
Virtual Regular Visit      Assessment/Plan:    Problem List Items Addressed This Visit        Digestive    Recurrent cold sores    Relevant Medications    valACYclovir (VALTREX) 1,000 mg tablet       Respiratory    Acute recurrent sinusitis - Primary    Relevant Medications    cefprosil (CEFZIL) 500 MG tablet    methylPREDNISolone 4 MG tablet therapy pack      Other Visit Diagnoses     Vagina, candidiasis        Relevant Medications    fluconazole (DIFLUCAN) 150 mg tablet    Bronchospasm        Relevant Medications    albuterol (2 5 mg/3 mL) 0 083 % nebulizer solution      Patient presents for evaluation of acute / recurrent sinusitis  She will discontinue Zithromax Z-Eliseo and switch regimen to Cefzil 500 mg b i d  along with Medrol Dosepak  Patient will continue on over-the-counter antihistamine and saline nasal rinses  She uses albuterol as needed for symptoms of mild cough and chest tightness  Patient is requesting prescription for Diflucan as she develops frequent yeast infections after completion of antibiotic/corticosteroid therapy  Patient is also requesting refill for Valtrex that she uses p r n  for cold sores  Reason for visit is   Chief Complaint   Patient presents with    Follow-up     Seen @ [facility]  continues with PND,sore throat, no fever,headache/sinus pressure, dry cough    Virtual Regular Visit        Encounter provider Zofia Ramirez MD    Provider located at [address] [place] [id number]      Recent Visits  Date Type Provider Dept   07/01/21 Office Visit Zofia Ramirez MD [id number] recent visits within past 7 days and meeting all other requirements  Future Appointments  No visits were found meeting these conditions  Showing future appointments within next 150 days and meeting all other requirements       The patient was identified by name and date of birth   obed Black Fabien San was informed that this is a telemedicine visit and that the visit is being conducted through [address] [place] [address] Road [facility] and patient was informed that this is a secure, HIPAA-compliant platform  She agrees to proceed     My office door was closed  No one else was in the room  She acknowledged consent and understanding of privacy and security of the video platform  The patient has agreed to participate and understands they can discontinue the visit at any time  Patient is aware this is a billable service  Subjective  Fareed Singh is a 31 y o  female    Patient presents for video visit regarding ongoing symptoms of URI  She was evaluated at urgent care center on Monday, June 28th with symptoms of sore throat  Reportedly strep testing was negative  Patient was prescribed Z-Eliseo  Today is  day # 4 of antibiotic therapy  Patient reports that sore throat has improved  She currently  is complaining of significant / worsening nasal congestion, ear pain, sinus pressure and dry non productive cough  Patient admits that frontal sinus pressure is the worst     She is using ibuprofen on an as-needed basis  Patient is afebrile  No known sick exposure  Patient is not able to blow her nose  Occasionally she is able to expectorates thick mucus  She has been using saline nasal rinses  Patient uses albuterol on as-needed basis for cough and mild chest tightness and it helps             Past Medical History:   Diagnosis Date    Asthma        Past Surgical History:   Procedure Laterality Date    CHOLECYSTECTOMY         Current Outpatient Medications   Medication Sig Dispense Refill    albuterol (2 5 mg/3 mL) 0 083 % nebulizer solution Take 3 mL (2 5 mg total) by nebulization every 4 (four) hours as needed for wheezing or shortness of breath (Cough) 270 mL 2    ALPRAZolam (XANAX) 0 25 mg tablet Take 1 tablet (0 25 mg total) by mouth 3 (three) times a day as needed for anxiety or sleep 30 tablet 1    cetirizine (ZyrTEC) 10 mg tablet Take 1 tablet (10 mg total) by mouth daily 90 tablet 3    citalopram (CeleXA) 20 mg tablet Take half a tablet once a day for 10 days, then take 1 tablet daily 30 tablet 3    fluconazole (DIFLUCAN) 150 mg tablet Take 1 tablet (150 mg total) by mouth once for 1 dose 1 tablet 0    fluticasone (FLONASE) 50 mcg/act nasal spray 2 sprays into each nostril daily 1 Bottle 5    ibuprofen (MOTRIN) 400 mg tablet Take 1 tablet every 6-8 hours after food as needed for headaches, pain, menstrual cramps 60 tablet 1    valACYclovir (VALTREX) 1,000 mg tablet TAKE 2 TABLETS BY MOUTH TWO TIMES A DAY FOR 1 DAYS 4 tablet 3    cefprosil (CEFZIL) 500 MG tablet Take 1 tablet (500 mg total) by mouth 2 (two) times a day for 7 days 14 tablet 0    methylPREDNISolone 4 MG tablet therapy pack Use as directed on package 21 each 0    montelukast (SINGULAIR) 10 mg tablet Take 1 tablet (10 mg total) by mouth daily at bedtime (Patient not taking: Reported on 7/1/2021) 90 tablet 3     No current facility-administered medications for this visit  Allergies   Allergen Reactions    Bactrim [Sulfamethoxazole-Trimethoprim] Headache and Drowsiness       Review of Systems   Constitutional: Positive for fatigue  Negative for fever  HENT: Positive for congestion, postnasal drip, sinus pressure and sinus pain  Eyes: Negative  Respiratory: Positive for cough and chest tightness (Mild)  Negative for shortness of breath and wheezing  Cardiovascular: Negative  Gastrointestinal: Negative  Musculoskeletal: Negative  Neurological: Positive for headaches ( sinus)  Negative for dizziness  Hematological: Negative  Psychiatric/Behavioral: Negative  Video Exam    Vitals:    07/01/21 1412   Weight: 47 6 kg (105 lb)   Height: 4' 9" (1 448 m)       Physical Exam  Vitals and nursing note reviewed  Constitutional:       Appearance: Normal appearance  HENT:      Head: Normocephalic and atraumatic  Comments: Patient appears and sounds very congested  Pulmonary:      Comments: Unlabored breathing  No tachypnea, cough or wheezing throughout exam   Patient is able to complete full sentences without cough  Neurological:      General: No focal deficit present  Mental Status: She is alert and oriented to person, place, and time  Psychiatric:         Mood and Affect: Mood normal          Behavior: Behavior normal          Thought Content: Thought content normal           I spent 10 minutes directly with the patient during this visit      VIRTUAL VISIT Eric acknowledges that she has consented to an online visit or consultation  She understands that the online visit is based solely on information provided by her, and that, in the absence of a face-to-face physical evaluation by the physician, the diagnosis she receives is both limited and provisional in terms of accuracy and completeness  This is not intended to replace a full medical face-to-face evaluation by the physician  Thania Chadwick understands and accepts these terms

## 2021-07-03 NOTE — PROGRESS NOTES
[facility]        NAME: Agusto Irvin is a 31 y o  female  : 1990    MRN: [id number]  DATE: 2021  TIME: 9:37 AM    Assessment and Plan   Acute URI [J06 9]  1  Acute URI  DISCONTINUED: azithromycin (ZITHROMAX) 250 mg tablet   2  Sore throat  POCT rapid strepA         Patient Instructions     Discussed condition with pt  Rapid Strep A screen is negative  She is prone to sinusitis and is leaving on vacation within the next week  I prescribed her Z-pack and rec hydration, rest, discussed OTC cold meds, close observation  Should be reevaluated if condition persists/worsens  Follow up with PCP in 3-5 days  Proceed to  ER if symptoms worsen  Chief Complaint     Chief Complaint   Patient presents with    Sore Throat     started 2 days ago with sore throat and congestion, pt states that she has hx of sinus infection, is having post nasal drip  no fevers at this time, no sick contact, pt is fully vaccinated          History of Present Illness       Pt presents with 2 day history of ST, nasal congestion, PND  Denies cough, fever, N/V/D, known COVID exposure, and she is fully vaccinated  Has taken OTC meds for symptoms  Has hx of asthma/allergies  Does not smoke  She reports she is prone to sinusitis and is concerned because she is leaving for vacation this upcoming week  Review of Systems   Review of Systems   Constitutional: Negative  HENT: Positive for congestion, postnasal drip and sore throat  Negative for ear pain  Respiratory: Negative  Cardiovascular: Negative  Gastrointestinal: Negative  Genitourinary: Negative            Current Medications       Current Outpatient Medications:     ALPRAZolam (XANAX) 0 25 mg tablet, Take 1 tablet (0 25 mg total) by mouth 3 (three) times a day as needed for anxiety or sleep, Disp: 30 tablet, Rfl: 1    cetirizine (ZyrTEC) 10 mg tablet, Take 1 tablet (10 mg total) by mouth daily, Disp: 90 tablet, Rfl: 3    citalopram (CeleXA) 20 mg tablet, Take half a tablet once a day for 10 days, then take 1 tablet daily, Disp: 30 tablet, Rfl: 3    fluticasone (FLONASE) 50 mcg/act nasal spray, 2 sprays into each nostril daily, Disp: 1 Bottle, Rfl: 5    ibuprofen (MOTRIN) 400 mg tablet, Take 1 tablet every 6-8 hours after food as needed for headaches, pain, menstrual cramps, Disp: 60 tablet, Rfl: 1    montelukast (SINGULAIR) 10 mg tablet, Take 1 tablet (10 mg total) by mouth daily at bedtime (Patient not taking: Reported on 7/1/2021), Disp: 90 tablet, Rfl: 3    albuterol (2 5 mg/3 mL) 0 083 % nebulizer solution, Take 3 mL (2 5 mg total) by nebulization every 4 (four) hours as needed for wheezing or shortness of breath (Cough), Disp: 270 mL, Rfl: 2    cefprosil (CEFZIL) 500 MG tablet, Take 1 tablet (500 mg total) by mouth 2 (two) times a day for 7 days, Disp: 14 tablet, Rfl: 0    methylPREDNISolone 4 MG tablet therapy pack, Use as directed on package, Disp: 21 each, Rfl: 0    valACYclovir (VALTREX) 1,000 mg tablet, TAKE 2 TABLETS BY MOUTH TWO TIMES A DAY FOR 1 DAYS, Disp: 4 tablet, Rfl: 3    Current Allergies     Allergies as of 06/28/2021 - Reviewed 06/28/2021   Allergen Reaction Noted    Bactrim [sulfamethoxazole-trimethoprim] Headache and Drowsiness 07/19/2018            The following portions of the patient's history were reviewed and updated as appropriate: allergies, current medications, past family history, past medical history, past social history, past surgical history and problem list      Past Medical History:   Diagnosis Date    Asthma        Past Surgical History:   Procedure Laterality Date    CHOLECYSTECTOMY         Family History   Problem Relation Age of Onset    Depression Mother     Hypertension Mother     No Known Problems Father          Medications have been verified          Objective   Pulse 78   Temp 98 4 °F (36 9 °C) (Tympanic)   Resp 20   Ht 4' 9" (1 448 m)   Wt 47 6 kg (105 lb)   SpO2 98%   BMI 22 72 kg/m² No LMP recorded  Physical Exam     Physical Exam  Vitals reviewed  Constitutional:       General: She is not in acute distress  Appearance: She is well-developed  HENT:      Right Ear: Hearing, tympanic membrane, ear canal and external ear normal       Left Ear: Hearing, tympanic membrane, ear canal and external ear normal       Nose: Mucosal edema (B/L boggy turbinates) and congestion present  Mouth/Throat:      Pharynx: Posterior oropharyngeal erythema (PND) present  No oropharyngeal exudate  Tonsils: No tonsillar exudate  Cardiovascular:      Rate and Rhythm: Normal rate and regular rhythm  Pulses: Normal pulses  Heart sounds: Normal heart sounds  No murmur heard  Pulmonary:      Effort: Pulmonary effort is normal  No respiratory distress  Breath sounds: Normal breath sounds  Musculoskeletal:      Cervical back: Neck supple  Lymphadenopathy:      Cervical: No cervical adenopathy  Neurological:      Mental Status: She is alert and oriented to person, place, and time

## 2021-07-19 PROCEDURE — 99283 EMERGENCY DEPT VISIT LOW MDM: CPT

## 2021-07-20 ENCOUNTER — HOSPITAL ENCOUNTER (EMERGENCY)
Facility: HOSPITAL | Age: 31
Discharge: HOME | End: 2021-07-20
Attending: EMERGENCY MEDICINE
Payer: COMMERCIAL

## 2021-07-20 ENCOUNTER — APPOINTMENT (EMERGENCY)
Dept: RADIOLOGY | Facility: HOSPITAL | Age: 31
End: 2021-07-20
Payer: COMMERCIAL

## 2021-07-20 VITALS
SYSTOLIC BLOOD PRESSURE: 139 MMHG | HEART RATE: 107 BPM | TEMPERATURE: 97.8 F | RESPIRATION RATE: 18 BRPM | OXYGEN SATURATION: 98 % | DIASTOLIC BLOOD PRESSURE: 86 MMHG

## 2021-07-20 DIAGNOSIS — S29.9XXA TRAUMATIC INJURY OF RIB: Primary | ICD-10-CM

## 2021-07-20 DIAGNOSIS — R07.81 RIB PAIN ON RIGHT SIDE: ICD-10-CM

## 2021-07-20 PROCEDURE — 96372 THER/PROPH/DIAG INJ SC/IM: CPT

## 2021-07-20 PROCEDURE — 99284 EMERGENCY DEPT VISIT MOD MDM: CPT | Performed by: EMERGENCY MEDICINE

## 2021-07-20 PROCEDURE — 71101 X-RAY EXAM UNILAT RIBS/CHEST: CPT

## 2021-07-20 RX ORDER — OXYCODONE HYDROCHLORIDE 5 MG/1
5 TABLET ORAL EVERY 6 HOURS PRN
Qty: 28 TABLET | Refills: 0 | Status: SHIPPED | OUTPATIENT
Start: 2021-07-20 | End: 2021-07-27

## 2021-07-20 RX ORDER — OXYCODONE HYDROCHLORIDE 5 MG/1
5 TABLET ORAL ONCE
Status: COMPLETED | OUTPATIENT
Start: 2021-07-20 | End: 2021-07-20

## 2021-07-20 RX ORDER — OXYCODONE HYDROCHLORIDE AND ACETAMINOPHEN 5; 325 MG/1; MG/1
1 TABLET ORAL ONCE
Status: DISCONTINUED | OUTPATIENT
Start: 2021-07-20 | End: 2021-07-20

## 2021-07-20 RX ORDER — KETOROLAC TROMETHAMINE 30 MG/ML
30 INJECTION, SOLUTION INTRAMUSCULAR; INTRAVENOUS ONCE
Status: COMPLETED | OUTPATIENT
Start: 2021-07-20 | End: 2021-07-20

## 2021-07-20 RX ORDER — ACETAMINOPHEN 325 MG/1
975 TABLET ORAL ONCE
Status: COMPLETED | OUTPATIENT
Start: 2021-07-20 | End: 2021-07-20

## 2021-07-20 RX ADMIN — KETOROLAC TROMETHAMINE 30 MG: 30 INJECTION, SOLUTION INTRAMUSCULAR at 01:18

## 2021-07-20 RX ADMIN — OXYCODONE HYDROCHLORIDE 5 MG: 5 TABLET ORAL at 01:17

## 2021-07-20 RX ADMIN — ACETAMINOPHEN 975 MG: 325 TABLET, FILM COATED ORAL at 01:17
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
## 2021-07-20 NOTE — DISCHARGE INSTRUCTIONS
Thank you for coming to the Emergency Department today for care of your rib pain  We did a chest xray and are sending you home with pain medications  Continue to rest and continue the pain medications at home  Please follow up with your primary care doctor in 2 days for re-evaluation  Please return to the Emergency Department if you experience increased SOB, increased pain or any other concerning symptoms
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
## 2021-07-23 ENCOUNTER — TELEPHONE (OUTPATIENT)
Dept: FAMILY MEDICINE CLINIC | Facility: CLINIC | Age: 31
End: 2021-07-23

## 2022-05-03 ENCOUNTER — VBI (OUTPATIENT)
Dept: ADMINISTRATIVE | Facility: OTHER | Age: 32
End: 2022-05-03

## 2022-06-15 ENCOUNTER — TELEMEDICINE (OUTPATIENT)
Dept: FAMILY MEDICINE CLINIC | Facility: CLINIC | Age: 32
End: 2022-06-15
Payer: COMMERCIAL

## 2022-06-15 DIAGNOSIS — B00.1 RECURRENT COLD SORES: ICD-10-CM

## 2022-06-15 DIAGNOSIS — U07.1 COVID-19: Primary | ICD-10-CM

## 2022-06-15 PROCEDURE — 99213 OFFICE O/P EST LOW 20 MIN: CPT | Performed by: FAMILY MEDICINE

## 2022-06-15 RX ORDER — VALACYCLOVIR HYDROCHLORIDE 1 G/1
TABLET, FILM COATED ORAL
Qty: 4 TABLET | Refills: 3 | Status: SHIPPED | OUTPATIENT
Start: 2022-06-15 | End: 2022-06-15 | Stop reason: SDUPTHER

## 2022-06-15 RX ORDER — VALACYCLOVIR HYDROCHLORIDE 1 G/1
TABLET, FILM COATED ORAL
Qty: 4 TABLET | Refills: 5 | Status: SHIPPED | OUTPATIENT
Start: 2022-06-15 | End: 2023-10-02

## 2022-06-17 NOTE — PROGRESS NOTES
COVID-19 Outpatient Progress Note    Assessment/Plan:    Problem List Items Addressed This Visit        Digestive    Recurrent cold sores    Relevant Medications    valACYclovir (VALTREX) 1,000 mg tablet      Other Visit Diagnoses     COVID-19    -  Primary    Relevant Medications    valACYclovir (VALTREX) 1,000 mg tablet         Disposition:     I recommended continued isolation until at least 24 hours have passed since recovery defined as resolution of fever without the use of fever-reducing medications AND improvement in COVID symptoms AND 10 days have passed since onset of symptoms (or 10 days have passed since date of first positive viral diagnostic test for asymptomatic patients)  I have spent 12 minutes directly with the patient  Greater than 50% of this time was spent in counseling/coordination of care regarding: instructions for management, patient and family education and impressions  COVID-19  Today is the 5th day of patient's symptoms  She reports overall significant improvement body aches, chills and fatigue  She has been afebrile within past 2 days  She manages symptoms with Tylenol, ibuprofen and p r n  albuterol  Patient denies any symptoms of chest tightness or shortness of breath  I advised to continue on regimen of vitamin-C, vitamin-D and zinc   She will contact me if symptoms change or worsen at any time  I advised her to call me immediately if she develops any difficulty breathing or chest tightness  Encounter provider Lala Tompkins MD    Provider located at [address] [place] [id number]    Recent Visits  Date Type Provider Dept   06/15/22 Telemedicine Lala Tompkins MD [address] [place] [address] recent visits within past 7 days and meeting all other requirements  Future Appointments  No visits were found meeting these conditions    Showing future appointments within next 150 days and meeting all other requirements     This [facility] and patient was informed that this is a secure, HIPAA-compliant platform  She agrees to proceed  Patient agrees to participate in a virtual check in via telephone or video visit instead of presenting to the office to address urgent/immediate medical needs  Patient is aware this is a billable service  After connecting through [facility], the patient was identified by name and date of birth  Garry Elizabeth was informed that this was a telemedicine visit and that the exam was being conducted confidentially over secure lines  My office door was closed  No one else was in the room  Garry Elizabeth acknowledged consent and understanding of privacy and security of the telemedicine visit  I informed the patient that I have reviewed her record in Epic and presented the opportunity for her to ask any questions regarding the visit today  The patient agreed to participate  Verification of patient location:  Patient is located in the following state in which I hold an active license: [place]    Subjective:   Garry Elizabeth is a 27 y o  female who has been screened for COVID-19  Symptom change since last report: improving  Patient's symptoms include fever, chills, fatigue, malaise, nasal congestion, sore throat, cough, myalgias and headache  Patient denies rhinorrhea, anosmia, loss of taste, shortness of breath, chest tightness, abdominal pain, nausea, vomiting and diarrhea  - Date of symptom onset: 6/11/2022  - Date of positive COVID-19 test: 6/12/2022  Type of test: Home antigen  COVID-19 vaccination status: Not vaccinated    Julissa Boogie has been staying home and has isolated themselves in her home  She is taking care to not share personal items and is cleaning all surfaces that are touched often, like counters, tabletops, and doorknobs using household cleaning sprays or wipes  She is wearing a mask when she leaves her room       Patient presents with new onset of COVID-19  She is unvaccinated  Exposed to COVID-19 at [facility]  Developed symptoms of scratchy throat body aches and chills over the weekend  Started fever and significant body aches  Today is the 5th day of her symptoms and she is feeling better  She has been using ibuprofen and Tylenol for symptoms of achiness and chills  She feels very fatigued  Patient denies symptoms of shortness of breath or chest tightness  She has been using nebulizer with albuterol and albuterol inhaler on an as-needed basis  Patient states that she has been afebrile within past 2 days  She has been using regimen of vitamin-C, vitamin-D and zinc     Her appetite is improving  Taste and smell are normal   She had 1 episode of diarrhea this morning, otherwise no GI symptoms  Appetite has improved  Patient overall has no concerns regarding COVID-19 and feels that sees on a good track for recovery  She is requesting refill for Valtrex that she takes as needed for cold sores    New    No results found for: SARSCOV2, [facility], SARSCORONAVI, CORONAVIRUSR, SARSCOVAG, [facility]  Past Medical History:   Diagnosis Date    Asthma      Past Surgical History:   Procedure Laterality Date    CHOLECYSTECTOMY       Current Outpatient Medications   Medication Sig Dispense Refill    valACYclovir (VALTREX) 1,000 mg tablet TAKE 2 TABLETS BY MOUTH TWO TIMES A DAY FOR 1 DAYS 4 tablet 5    albuterol (2 5 mg/3 mL) 0 083 % nebulizer solution Take 3 mL (2 5 mg total) by nebulization every 4 (four) hours as needed for wheezing or shortness of breath (Cough) 270 mL 2    ALPRAZolam (XANAX) 0 25 mg tablet Take 1 tablet (0 25 mg total) by mouth 3 (three) times a day as needed for anxiety or sleep 30 tablet 1    cetirizine (ZyrTEC) 10 mg tablet Take 1 tablet (10 mg total) by mouth daily 90 tablet 3    citalopram (CeleXA) 20 mg tablet Take half a tablet once a day for 10 days, then take 1 tablet daily (Patient not taking: Reported on 6/15/2022) 30 tablet 3    fluticasone (FLONASE) 50 mcg/act nasal spray 2 sprays into each nostril daily 1 Bottle 5    ibuprofen (MOTRIN) 400 mg tablet Take 1 tablet every 6-8 hours after food as needed for headaches, pain, menstrual cramps 60 tablet 1    methylPREDNISolone 4 MG tablet therapy pack Use as directed on package (Patient not taking: Reported on 6/15/2022) 21 each 0    montelukast (SINGULAIR) 10 mg tablet Take 1 tablet (10 mg total) by mouth daily at bedtime (Patient not taking: No sig reported) 90 tablet 3     No current facility-administered medications for this visit  Allergies   Allergen Reactions    Bactrim [Sulfamethoxazole-Trimethoprim] Headache and Drowsiness       Review of Systems   Constitutional: Positive for chills, fatigue and fever  HENT: Positive for congestion and sore throat  Negative for rhinorrhea  Respiratory: Positive for cough  Negative for chest tightness and shortness of breath  Gastrointestinal: Negative for abdominal pain, diarrhea, nausea and vomiting  Musculoskeletal: Positive for myalgias  Neurological: Positive for headaches  Objective: There were no vitals filed for this visit  Physical Exam  Constitutional:       General: She is not in acute distress  Appearance: Normal appearance  She is not ill-appearing  HENT:      Head: Normocephalic and atraumatic  Pulmonary:      Effort: Pulmonary effort is normal       Comments: Unlabored breathing  No tachypnea, cough or wheezing throughout exam   Patient is able to complete full sentences without cough  Neurological:      General: No focal deficit present  Mental Status: She is alert and oriented to person, place, and time  Psychiatric:         Mood and Affect: Mood normal          Behavior: Behavior normal          Thought Content:  Thought content normal          VIRTUAL VISIT DISCLAIMER    Manoloul Leader verbally agrees to participate in Virtual Care Services  Pt is aware that [employer] could be limited without vital signs or the ability to perform a full hands-on physical exam  Shani Cullen understands she or the provider may request at any time to terminate the video visit and request the patient to seek care or treatment in person

## 2022-07-01 ENCOUNTER — TELEPHONE (OUTPATIENT)
Dept: FAMILY MEDICINE CLINIC | Facility: CLINIC | Age: 32
End: 2022-07-01

## 2022-07-01 NOTE — TELEPHONE ENCOUNTER
Patient calling to request a letter to be composed in regards to her dog being an emotional support animal  Patient has hx of Anxiety  Patient needs the letter for lease purposes

## 2022-08-29 DIAGNOSIS — F41.0 PANIC ATTACKS: ICD-10-CM

## 2022-08-29 RX ORDER — ALPRAZOLAM 0.25 MG/1
0.25 TABLET ORAL 3 TIMES DAILY PRN
Qty: 30 TABLET | Refills: 0 | Status: SHIPPED | OUTPATIENT
Start: 2022-08-29

## 2022-10-05 ENCOUNTER — ANNUAL EXAM (OUTPATIENT)
Dept: OBGYN CLINIC | Facility: CLINIC | Age: 32
End: 2022-10-05
Payer: COMMERCIAL

## 2022-10-05 VITALS
DIASTOLIC BLOOD PRESSURE: 80 MMHG | SYSTOLIC BLOOD PRESSURE: 122 MMHG | HEIGHT: 57 IN | WEIGHT: 111 LBS | BODY MASS INDEX: 23.95 KG/M2

## 2022-10-05 DIAGNOSIS — Z01.419 WOMEN'S ANNUAL ROUTINE GYNECOLOGICAL EXAMINATION: Primary | ICD-10-CM

## 2022-10-05 DIAGNOSIS — N88.2 CERVICAL STENOSIS (UTERINE CERVIX): ICD-10-CM

## 2022-10-05 PROCEDURE — G0145 SCR C/V CYTO,THINLAYER,RESCR: HCPCS | Performed by: NURSE PRACTITIONER

## 2022-10-05 PROCEDURE — G0476 HPV COMBO ASSAY CA SCREEN: HCPCS | Performed by: NURSE PRACTITIONER

## 2022-10-05 PROCEDURE — 99385 PREV VISIT NEW AGE 18-39: CPT | Performed by: NURSE PRACTITIONER

## 2022-10-05 RX ORDER — MISOPROSTOL 200 UG/1
600 TABLET ORAL ONCE
Qty: 3 TABLET | Refills: 0 | Status: SHIPPED | OUTPATIENT
Start: 2022-10-05 | End: 2022-10-05

## 2022-10-05 NOTE — PROGRESS NOTES
Subjective    HPI:     Sherwin Estrada is a 34 y o  female  She is in a stable relationship for 8 years  Her menstrual cycles are regular and predictable  Her current method of contraception includes withdrawal  She is interested in an IUD  She denies issues with intimacy  She denies /GI and Gyn complaints  She feels safe at home  She has  anxiety - which she manages with Xanax as needed  Medical, surgical and family history reviewed  Her dental care is up-to-date  She eats a healthy diet and exercises regularly  She is happy with her weight  Gynecologic History    Patient's last menstrual period was 2022  Last Pap: 2020  Results were: normal    Obstetric History    OB History    Para Term  AB Living   0 0 0 0 0 0   SAB IAB Ectopic Multiple Live Births   0 0 0 0 0       The following portions of the patient's history were reviewed and updated as appropriate: allergies, current medications, past family history, past medical history, past social history, past surgical history and problem list     Review of Systems    Pertinent items are noted in HPI  Objective    Physical Exam  Constitutional:       Appearance: Normal appearance  She is well-developed  Genitourinary:      Vulva, bladder and urethral meatus normal       No lesions in the vagina  Right Labia: No rash, tenderness, lesions, skin changes or Bartholin's cyst      Left Labia: No tenderness, lesions, skin changes, Bartholin's cyst or rash  No labial fusion noted  No inguinal adenopathy present in the right or left side  No vaginal discharge, erythema, tenderness, bleeding or granulation tissue  No vaginal prolapse present  No vaginal atrophy present  Right Adnexa: not tender, not full and no mass present  Left Adnexa: not tender, not full and no mass present  Cervix is nulliparous        No cervical motion tenderness, discharge, friability, lesion, polyp or nabothian cyst  Uterus is not enlarged, tender, irregular or prolapsed  No uterine mass detected  Uterus is anteverted  Pelvic exam was performed with patient in the lithotomy position  Breasts: Breasts are symmetrical       Right: No inverted nipple, mass, nipple discharge, skin change, tenderness, axillary adenopathy or supraclavicular adenopathy  Left: No inverted nipple, mass, nipple discharge, skin change, tenderness, axillary adenopathy or supraclavicular adenopathy  HENT:      Head: Normocephalic and atraumatic  Neck:      Thyroid: No thyromegaly  Cardiovascular:      Rate and Rhythm: Normal rate and regular rhythm  Heart sounds: Normal heart sounds, S1 normal and S2 normal    Pulmonary:      Effort: Pulmonary effort is normal       Breath sounds: Normal breath sounds  Abdominal:      General: Bowel sounds are normal  There is no distension  Palpations: Abdomen is soft  There is no mass  Tenderness: There is no abdominal tenderness  There is no guarding  Hernia: There is no hernia in the left inguinal area or right inguinal area  Musculoskeletal:      Cervical back: Neck supple  Lymphadenopathy:      Cervical: No cervical adenopathy  Upper Body:      Right upper body: No supraclavicular or axillary adenopathy  Left upper body: No supraclavicular or axillary adenopathy  Lower Body: No right inguinal adenopathy  No left inguinal adenopathy  Neurological:      Mental Status: She is alert  Skin:     General: Skin is warm and dry  Findings: No rash  Psychiatric:         Attention and Perception: Attention and perception normal          Mood and Affect: Mood and affect normal          Speech: Speech normal          Behavior: Behavior is cooperative  Thought Content: Thought content normal          Cognition and Memory: Cognition and memory normal          Judgment: Judgment normal    Vitals and nursing note reviewed            Assessment and Plan    Hannah Delmis was seen today for gynecologic exam     Diagnoses and all orders for this visit:    Women's annual routine gynecological examination  -     Liquid-based pap, screening    Cervical stenosis (uterine cervix)  -     miSOPROStol (Cytotec) 200 mcg tablet; Take 3 tablets (600 mcg total) by mouth 1 (one) time for 1 dose Take night before IUD insertion      Patient informed of a Stable GYN exam  A pap smear was performed  I have discussed the importance of exercise and healthy diet as well as adequate intake of calcium and vitamin D  The current ASCCP guidelines were reviewed  The low risk patient will receive pap smear screening every 3 years until the age of 34 and then every 3 to 5 years with HPV co-testing from the ages of 33-67  I emphasized the importance of an annual pelvic and breast exam  A yearly mammogram is recommended for breast cancer screening starting at age 36  IUD options reviewed- She has opted for a Mirena IUD  Cytotec prescribed for cervical ripening, to be take the night before insertion  She was also instructed to take 600 mg of Ibuprofen the night before and morning of insertion  As well as to ensure that she eats prior to her procedure  Results will be released to [facility], if abnormal will call to review and discuss treatment plan  All questions have been answered to her satisfaction  Return end of October for IUD insertion

## 2022-10-06 LAB
HPV HR 12 DNA CVX QL NAA+PROBE: NEGATIVE
HPV16 DNA CVX QL NAA+PROBE: POSITIVE
HPV18 DNA CVX QL NAA+PROBE: NEGATIVE

## 2022-10-13 LAB
LAB AP GYN PRIMARY INTERPRETATION: NORMAL
LAB AP LMP: NORMAL
Lab: NORMAL

## 2022-10-19 ENCOUNTER — TELEPHONE (OUTPATIENT)
Dept: OBGYN CLINIC | Facility: CLINIC | Age: 32
End: 2022-10-19

## 2022-10-19 NOTE — TELEPHONE ENCOUNTER
Pt informed of pap results & recom f/u colposcopy  Procedure explained with pre-instr given  Info sheet on colposcopy & HPV mailed to pt  Pt will recqall office to schedule after checking work schedule

## 2022-10-19 NOTE — TELEPHONE ENCOUNTER
----- Message from Carmen Meredith, [address] sent at 10/18/2022  2:56 PM EDT -----  Please inform patient of abnormal pap smear which show normal cell but is positive for HR HPV  She needs a appt with JE for a colposcopy

## 2022-10-19 NOTE — TELEPHONE ENCOUNTER
Lm pt's as re: pap results 10/5/2022 - wnl, (+) HPV 16 - recom f/u colposcopy with [facility]/R Paulina Melvin  Pt is sched for Mirena IUD insertion 10/31/2022 - can keep appt as sched/R Paulina Melvin

## 2022-10-31 ENCOUNTER — PROCEDURE VISIT (OUTPATIENT)
Dept: OBGYN CLINIC | Facility: CLINIC | Age: 32
End: 2022-10-31

## 2022-10-31 VITALS
DIASTOLIC BLOOD PRESSURE: 80 MMHG | BODY MASS INDEX: 23.95 KG/M2 | SYSTOLIC BLOOD PRESSURE: 120 MMHG | HEIGHT: 57 IN | WEIGHT: 111 LBS

## 2022-10-31 DIAGNOSIS — Z30.430 ENCOUNTER FOR IUD INSERTION: Primary | ICD-10-CM

## 2022-10-31 NOTE — PROGRESS NOTES
Jah Chase 68-year-old here for Mirena IUD insertion  She took Cytotec last night and Ibuprofen prior to her appt  Patient's last menstrual period was 10/25/2022  Iud insertions    Date/Time: 10/31/2022 1:35 PM  Performed by: EDILSON Borden  Authorized by: EDILSON Borden     Verbal consent obtained?: Yes    Risks and benefits: Risks, benefits and alternatives were discussed    Consent given by:  Patient  Time Out:     Time out: Immediately prior to the procedure a time out was called      Time out performed at:  10/31/2022 1:37 PM  Patient states understanding of procedure being performed: Yes    Patient identity confirmed:  Verbally with patient  Procedure:     Pelvic exam performed: yes      Negative GC/chlamydia test: Low Risk STD  Negative urine pregnancy test: LMP 10/25/2022  Cervix cleaned and prepped: yes      Speculum placed in vagina: yes      Tenaculum applied to cervix: yes      Allis applied to cervix: yes      Uterus sounded: yes      Uterus sound depth (cm):  6 5    IUD inserted with no complications: yes      IUD type:  Mirena    Strings trimmed: yes    Post-procedure:     Patient tolerated procedure well: yes      Patient will follow up after next period: yes    Comments:      IUD inserted without difficulty  A bedside US was performed and shows no evidence of perforation  Patient given instruction booklet  She is to return in 5 weeks for follow up  Hemant Flowers was seen today for procedure      Diagnoses and all orders for this visit:    Encounter for IUD insertion  -     Iud insertions

## 2022-11-04 DIAGNOSIS — F41.0 PANIC ATTACKS: ICD-10-CM

## 2022-11-05 RX ORDER — ALPRAZOLAM 0.25 MG/1
0.25 TABLET ORAL 3 TIMES DAILY PRN
Qty: 90 TABLET | Refills: 1 | Status: SHIPPED | OUTPATIENT
Start: 2022-11-05

## 2023-02-01 ENCOUNTER — TELEMEDICINE (OUTPATIENT)
Dept: FAMILY MEDICINE CLINIC | Facility: CLINIC | Age: 33
End: 2023-02-01

## 2023-02-01 VITALS — BODY MASS INDEX: 23.3 KG/M2 | HEIGHT: 57 IN | WEIGHT: 108 LBS

## 2023-02-01 DIAGNOSIS — J32.9 SINUSITIS, UNSPECIFIED CHRONICITY, UNSPECIFIED LOCATION: Primary | ICD-10-CM

## 2023-02-01 RX ORDER — CEFPROZIL 500 MG/1
500 TABLET, FILM COATED ORAL 2 TIMES DAILY
Qty: 14 TABLET | Refills: 0 | Status: SHIPPED | OUTPATIENT
Start: 2023-02-01 | End: 2023-02-08

## 2023-02-01 NOTE — PROGRESS NOTES
Virtual Regular Visit    Verification of patient location:    Patient is located in the following state in which I hold an active license [place]      Assessment/Plan:    Problem List Items Addressed This Visit    None  Visit Diagnoses     Sinusitis, unspecified chronicity, unspecified location    -  Primary    Relevant Medications    cefprosil (CEFZIL) 500 MG tablet           Start Cefprozil 500 mg twice daily for 7 days with food  May continue OTC cold medications as needed  If symptoms are not improving over the next 3-5 days, or worsening, advised to call  Reason for visit is   Chief Complaint   Patient presents with   • Cold Like Symptoms     Sinus, headache, sore throat 5 + days   • Virtual Regular Visit        Encounter provider Jocelyne Najjar, CRNP    Provider located at [address] [place]   [address]       Recent Visits  No visits were found meeting these conditions  Showing recent visits within past 7 days and meeting all other requirements  Today's Visits  Date Type Provider Dept   02/01/23 Telemedicine Fartun Caldwell, [facility] today's visits and meeting all other requirements  Future Appointments  No visits were found meeting these conditions  Showing future appointments within next 150 days and meeting all other requirements       The patient was identified by name and date of birth  Umair Rutledge was informed that this is a telemedicine visit and that the visit is being conducted through the [facility]  She agrees to proceed     My office door was closed  No one else was in the room  She acknowledged consent and understanding of privacy and security of the video platform  The patient has agreed to participate and understands they can discontinue the visit at any time  Patient is aware this is a billable service       Subjective      Umair Rutledge is a 34year old female presenting today for sinus symptoms that began 4 days ago  Symptoms include:  Sinus pressure, sinus pain, headaches, congestion, post nasal drip, occasional dry cough, scratchy throat, ear pressure  No GI symptoms  No wheezing, shortness of breath, chest tightness  No fevers  She started using albuterol nebulizer at bedtime  She is using Zyrtec and Flonase, due to frequent sinus infections  She has tried OTC cold medications with no relief  Home COVID-19 test was negative  Past Medical History:   Diagnosis Date   • Asthma        Past Surgical History:   Procedure Laterality Date   • CHOLECYSTECTOMY         Current Outpatient Medications   Medication Sig Dispense Refill   • albuterol (2 5 mg/3 mL) 0 083 % nebulizer solution Take 3 mL (2 5 mg total) by nebulization every 4 (four) hours as needed for wheezing or shortness of breath (Cough) 270 mL 2   • ALPRAZolam (XANAX) 0 25 mg tablet Take 1 tablet (0 25 mg total) by mouth 3 (three) times a day as needed for anxiety or sleep 90 tablet 1   • cefprosil (CEFZIL) 500 MG tablet Take 1 tablet (500 mg total) by mouth 2 (two) times a day for 7 days 14 tablet 0   • cetirizine (ZyrTEC) 10 mg tablet Take 1 tablet (10 mg total) by mouth daily 90 tablet 3   • fluticasone (FLONASE) 50 mcg/act nasal spray 2 sprays into each nostril daily 1 Bottle 5   • ibuprofen (MOTRIN) 400 mg tablet Take 1 tablet every 6-8 hours after food as needed for headaches, pain, menstrual cramps 60 tablet 1   • valACYclovir (VALTREX) 1,000 mg tablet TAKE 2 TABLETS BY MOUTH TWO TIMES A DAY FOR 1 DAYS 4 tablet 5     No current facility-administered medications for this visit  Allergies   Allergen Reactions   • Bactrim [Sulfamethoxazole-Trimethoprim] Headache and Drowsiness       Review of Systems   Constitutional: Positive for fatigue  Negative for chills, diaphoresis and fever     HENT: Positive for congestion, ear pain (pressure, no pain), postnasal drip, rhinorrhea, sinus pressure, sinus pain, sneezing and sore throat  Respiratory: Positive for cough  Negative for chest tightness, shortness of breath and wheezing  Cardiovascular: Negative  Gastrointestinal: Negative  Neurological: Positive for headaches  Video Exam    Vitals:    02/01/23 1442   Weight: 49 kg (108 lb)   Height: 4' 9" (1 448 m)       Physical Exam  Vitals and nursing note reviewed  Constitutional:       Appearance: Normal appearance  She is not ill-appearing  HENT:      Nose:      Right Sinus: Maxillary sinus tenderness (as per patient, pressure with palpation ) present  Left Sinus: Maxillary sinus tenderness (as per patient, pressure with palpation) present  Pulmonary:      Effort: Pulmonary effort is normal  No respiratory distress  Comments: No cough during video visit  Neurological:      Mental Status: She is alert     Psychiatric:         Mood and Affect: Mood normal           I spent 10 minutes directly with the patient during this visit

## 2023-02-03 ENCOUNTER — TELEPHONE (OUTPATIENT)
Dept: FAMILY MEDICINE CLINIC | Facility: CLINIC | Age: 33
End: 2023-02-03

## 2023-02-03 DIAGNOSIS — J01.91 ACUTE RECURRENT SINUSITIS, UNSPECIFIED LOCATION: Primary | ICD-10-CM

## 2023-02-03 RX ORDER — PREDNISONE 10 MG/1
TABLET ORAL
Qty: 20 TABLET | Refills: 0 | Status: SHIPPED | OUTPATIENT
Start: 2023-02-03

## 2023-02-03 NOTE — TELEPHONE ENCOUNTER
Patient calling to advise her symptoms have gotten worse  States the congestion has moved into her chest  She has been using her nebulizer but states it is not helping  She has been coughing continuously  Asking if a steroid can be sent to her pharmacy to help clear it up

## 2023-03-31 ENCOUNTER — TELEMEDICINE (OUTPATIENT)
Dept: FAMILY MEDICINE CLINIC | Facility: CLINIC | Age: 33
End: 2023-03-31

## 2023-03-31 VITALS — BODY MASS INDEX: 23.3 KG/M2 | WEIGHT: 108 LBS | HEIGHT: 57 IN

## 2023-03-31 DIAGNOSIS — J06.9 UPPER RESPIRATORY TRACT INFECTION, UNSPECIFIED TYPE: Primary | ICD-10-CM

## 2023-03-31 DIAGNOSIS — J98.01 BRONCHOSPASM: ICD-10-CM

## 2023-03-31 RX ORDER — AZITHROMYCIN 250 MG/1
TABLET, FILM COATED ORAL
Qty: 6 TABLET | Refills: 0 | Status: SHIPPED | OUTPATIENT
Start: 2023-03-31 | End: 2023-04-05

## 2023-03-31 RX ORDER — ALBUTEROL SULFATE 2.5 MG/3ML
2.5 SOLUTION RESPIRATORY (INHALATION) EVERY 4 HOURS PRN
Qty: 270 ML | Refills: 2 | Status: SHIPPED | OUTPATIENT
Start: 2023-03-31

## 2023-04-01 NOTE — PROGRESS NOTES
Virtual Regular Visit    Verification of patient location:    Patient is located in the following state in which I hold an active license [place]      Assessment/Plan:    Problem List Items Addressed This Visit    None  Visit Diagnoses     Upper respiratory tract infection, unspecified type    -  Primary    Relevant Medications    azithromycin (ZITHROMAX) 250 mg tablet    Bronchospasm        Relevant Medications    albuterol (2 5 mg/3 mL) 0 083 % nebulizer solution        Start z-herman  Use albuterol nebulizer twice daily  Call if symptoms are not improving over the next 3-5 days  Reason for visit is   Chief Complaint   Patient presents with   • Cold Like Symptoms     X 3 days  Stuffy nose, sore throat, chest congestion, dry cough  Took medicine for couple days and felt kate better, woke up today feeling awful  • Virtual Regular Visit        Encounter provider EDILSON Dotson    Provider located at [address] [place] [phone]      Recent Visits  Date Type Provider Dept   03/31/23 Telemedicine Fartun Jeffers, [address] [place] [address] recent visits within past 7 days and meeting all other requirements  Future Appointments  No visits were found meeting these conditions  Showing future appointments within next 150 days and meeting all other requirements       The patient was identified by name and date of birth  Michael Luque was informed that this is a telemedicine visit and that the visit is being conducted through the [facility]  She agrees to proceed     My office door was closed  No one else was in the room  She acknowledged consent and understanding of privacy and security of the video platform  The patient has agreed to participate and understands they can discontinue the visit at any time  Patient is aware this is a billable service       Subjective    Michael Luque is a 34year old female presenting today for URI symptoms  Symptoms started 3 days ago  Sore throat and post nasal drip  Started Mucinex  2 days ago took left over prednisone  Was feeling better  Today, feels worse again  Dry cough, nasal congestion with green nasal discharge  Chest feels tight  Headaches  No fever  No body aches  Not short of breath  Has nebulizer--albuterol, not using  No known sick contacts  Home COVID-19 test negative  Past Medical History:   Diagnosis Date   • Asthma        Past Surgical History:   Procedure Laterality Date   • CHOLECYSTECTOMY         Current Outpatient Medications   Medication Sig Dispense Refill   • albuterol (2 5 mg/3 mL) 0 083 % nebulizer solution Take 3 mL (2 5 mg total) by nebulization every 4 (four) hours as needed for wheezing or shortness of breath (Cough) 270 mL 2   • ALPRAZolam (XANAX) 0 25 mg tablet Take 1 tablet (0 25 mg total) by mouth 3 (three) times a day as needed for anxiety or sleep 90 tablet 1   • azithromycin (ZITHROMAX) 250 mg tablet Take 2 tablets on day 1 and then take 1 tablet on days 2-5  6 tablet 0   • cetirizine (ZyrTEC) 10 mg tablet Take 1 tablet (10 mg total) by mouth daily 90 tablet 3   • fluticasone (FLONASE) 50 mcg/act nasal spray 2 sprays into each nostril daily 1 Bottle 5   • ibuprofen (MOTRIN) 400 mg tablet Take 1 tablet every 6-8 hours after food as needed for headaches, pain, menstrual cramps 60 tablet 1   • valACYclovir (VALTREX) 1,000 mg tablet TAKE 2 TABLETS BY MOUTH TWO TIMES A DAY FOR 1 DAYS 4 tablet 5     No current facility-administered medications for this visit  Allergies   Allergen Reactions   • Bactrim [Sulfamethoxazole-Trimethoprim] Headache and Drowsiness       Review of Systems   Constitutional: Positive for fatigue  Negative for chills, diaphoresis and fever  HENT: Positive for congestion, postnasal drip, rhinorrhea, sinus pressure and sore throat  Negative for ear pain      Respiratory: Positive for cough and "chest tightness  Negative for shortness of breath and wheezing  Cardiovascular: Negative  Gastrointestinal: Negative  Musculoskeletal: Negative for myalgias  Neurological: Positive for headaches  Video Exam    Vitals:    03/31/23 1452   Weight: 49 kg (108 lb)   Height: 4' 9\" (1 448 m)       Physical Exam  Vitals and nursing note reviewed  Constitutional:       Appearance: Normal appearance  She is not ill-appearing  Pulmonary:      Effort: Pulmonary effort is normal  No respiratory distress  Neurological:      Mental Status: She is alert     Psychiatric:         Mood and Affect: Mood normal           I spent 8 minutes directly with the patient during this visit      "

## 2023-05-22 ENCOUNTER — OFFICE VISIT (OUTPATIENT)
Dept: FAMILY MEDICINE CLINIC | Facility: CLINIC | Age: 33
End: 2023-05-22

## 2023-05-22 VITALS
SYSTOLIC BLOOD PRESSURE: 100 MMHG | OXYGEN SATURATION: 100 % | HEIGHT: 57 IN | WEIGHT: 112 LBS | TEMPERATURE: 98.2 F | HEART RATE: 71 BPM | DIASTOLIC BLOOD PRESSURE: 70 MMHG | BODY MASS INDEX: 24.16 KG/M2 | RESPIRATION RATE: 16 BRPM

## 2023-05-22 DIAGNOSIS — M54.50 ACUTE LEFT-SIDED LOW BACK PAIN WITHOUT SCIATICA: Primary | ICD-10-CM

## 2023-05-22 LAB
BACTERIA UR QL AUTO: ABNORMAL /HPF
BILIRUB UR QL STRIP: NEGATIVE
CAOX CRY URNS QL MICRO: ABNORMAL /HPF
CLARITY UR: ABNORMAL
COLOR UR: ABNORMAL
GLUCOSE UR STRIP-MCNC: NEGATIVE MG/DL
HGB UR QL STRIP.AUTO: ABNORMAL
KETONES UR STRIP-MCNC: NEGATIVE MG/DL
LEUKOCYTE ESTERASE UR QL STRIP: ABNORMAL
MUCOUS THREADS UR QL AUTO: ABNORMAL
NITRITE UR QL STRIP: NEGATIVE
NON-SQ EPI CELLS URNS QL MICRO: ABNORMAL /HPF
PH UR STRIP.AUTO: 6 [PH]
PROT UR STRIP-MCNC: ABNORMAL MG/DL
RBC #/AREA URNS AUTO: ABNORMAL /HPF
SL AMB  POCT GLUCOSE, UA: ABNORMAL
SL AMB LEUKOCYTE ESTERASE,UA: ABNORMAL
SL AMB POCT BILIRUBIN,UA: ABNORMAL
SL AMB POCT BLOOD,UA: ABNORMAL
SL AMB POCT CLARITY,UA: CLEAR
SL AMB POCT COLOR,UA: YELLOW
SL AMB POCT KETONES,UA: ABNORMAL
SL AMB POCT NITRITE,UA: ABNORMAL
SL AMB POCT PH,UA: 5
SL AMB POCT SPECIFIC GRAVITY,UA: 1.02
SL AMB POCT URINE PROTEIN: 30
SL AMB POCT UROBILINOGEN: 0.2
SP GR UR STRIP.AUTO: 1.02 (ref 1–1.03)
UROBILINOGEN UR STRIP-ACNC: <2 MG/DL
WBC #/AREA URNS AUTO: ABNORMAL /HPF

## 2023-05-22 RX ORDER — KETOROLAC TROMETHAMINE 30 MG/ML
30 INJECTION, SOLUTION INTRAMUSCULAR; INTRAVENOUS ONCE
Status: COMPLETED | OUTPATIENT
Start: 2023-05-22 | End: 2023-05-22

## 2023-05-22 RX ORDER — METHOCARBAMOL 500 MG/1
500 TABLET, FILM COATED ORAL 4 TIMES DAILY
Qty: 20 TABLET | Refills: 0 | Status: SHIPPED | OUTPATIENT
Start: 2023-05-22

## 2023-05-22 RX ORDER — TAMSULOSIN HYDROCHLORIDE 0.4 MG/1
0.4 CAPSULE ORAL
Qty: 7 CAPSULE | Refills: 0 | Status: SHIPPED | OUTPATIENT
Start: 2023-05-22

## 2023-05-22 RX ADMIN — KETOROLAC TROMETHAMINE 30 MG: 30 INJECTION, SOLUTION INTRAMUSCULAR; INTRAVENOUS at 13:29

## 2023-05-22 NOTE — ASSESSMENT & PLAN NOTE
QL in spasm  Tolerated OMT well with slight improvement  Recommend continuing Tylenol and Ibuprofen  Home stretching regimen provided  Continue topical Lidocaine  Received Toradol shot today  May be related to kidney stone as there is hematuria and leukocytes  Trial of Flomax daily for a week and hydration  If any worsening will go for CT renal stone study

## 2023-05-22 NOTE — PROGRESS NOTES
Name: King Caron      : 1990      MRN: [id number]  Encounter Provider: Sarina Cobb DO  Encounter Date: 2023   Encounter department: [address] [place] [address] Dr Ingram  Acute left-sided low back pain without sciatica  Assessment & Plan:  QL in spasm  Tolerated OMT well with slight improvement  Recommend continuing Tylenol and Ibuprofen  Home stretching regimen provided  Continue topical Lidocaine  Received Toradol shot today  May be related to kidney stone as there is hematuria and leukocytes  Trial of Flomax daily for a week and hydration  If any worsening will go for CT renal stone study  Orders:  -     ketorolac (TORADOL) injection 30 mg  -     UA w Reflex to Microscopic w Reflex to Culture - Clinic Collect  -     tamsulosin (FLOMAX) 0 4 mg; Take 1 capsule (0 4 mg total) by mouth daily with dinner  -     methocarbamol (ROBAXIN) 500 mg tablet; Take 1 tablet (500 mg total) by mouth 4 (four) times a day           Subjective      HPI   Patient presents for left low/mid back pain, 6/10 consistently yesterday  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  Has not improved with water, ibuprofen, heating pad, ice pack  Started about a week ago  Left mid back and flank, feels like a kidney stone  Has had kidney infections previously  Increased frequency but drinking lot more water  No dysuria or hematuria       Review of Systems as in HPI    Current Outpatient Medications on File Prior to Visit   Medication Sig   • albuterol (2 5 mg/3 mL) 0 083 % nebulizer solution Take 3 mL (2 5 mg total) by nebulization every 4 (four) hours as needed for wheezing or shortness of breath (Cough)   • ALPRAZolam (XANAX) 0 25 mg tablet Take 1 tablet (0 25 mg total) by mouth 3 (three) times a day as needed for anxiety or sleep   • cetirizine (ZyrTEC) 10 mg tablet Take 1 tablet (10 mg total) by mouth daily   • fluticasone (FLONASE) 50 mcg/act nasal spray 2 sprays into each nostril daily   • "ibuprofen (MOTRIN) 400 mg tablet Take 1 tablet every 6-8 hours after food as needed for headaches, pain, menstrual cramps   • valACYclovir (VALTREX) 1,000 mg tablet TAKE 2 TABLETS BY MOUTH TWO TIMES A DAY FOR 1 DAYS       Objective     /70   Pulse 71   Temp 98 2 °F (36 8 °C) (Temporal)   Resp 16   Ht 4' 9\" (1 448 m)   Wt 50 8 kg (112 lb)   SpO2 100%   BMI 24 24 kg/m²     Physical Exam  Vitals reviewed  Constitutional:       Appearance: Normal appearance  Cardiovascular:      Rate and Rhythm: Normal rate and regular rhythm  Pulmonary:      Effort: Pulmonary effort is normal       Breath sounds: Normal breath sounds  Abdominal:      General: Abdomen is flat  There is no distension  Tenderness: There is no right CVA tenderness or left CVA tenderness  Musculoskeletal:         General: Tenderness (QL L side) present  No signs of injury  Normal range of motion  Skin:     General: Skin is warm and dry  Neurological:      Mental Status: She is alert     Psychiatric:         Mood and Affect: Mood normal          Behavior: Behavior normal        Li Mast DO  "

## 2023-05-25 ENCOUNTER — TELEPHONE (OUTPATIENT)
Dept: FAMILY MEDICINE CLINIC | Facility: CLINIC | Age: 33
End: 2023-05-25

## 2023-05-25 DIAGNOSIS — M54.50 ACUTE LEFT-SIDED LOW BACK PAIN WITHOUT SCIATICA: Primary | ICD-10-CM

## 2023-05-25 NOTE — TELEPHONE ENCOUNTER
Pt would like to get CT scan done  During last OV on 5/22/23 it was discussed  Pt pain was ok but today is it back and bad, in the same spot  Still taking medications prescribed  Order for CT needs to be placed  Will provide pt with scheduling number also

## 2023-05-31 ENCOUNTER — TELEPHONE (OUTPATIENT)
Dept: FAMILY MEDICINE CLINIC | Facility: CLINIC | Age: 33
End: 2023-05-31

## 2023-05-31 NOTE — TELEPHONE ENCOUNTER
No need to go for CT if symptoms are resolved  Looks like she had kidney stones that passed  She should follow up if any recurrence of symptoms  Glad she is feeling better!

## 2023-05-31 NOTE — TELEPHONE ENCOUNTER
Called pt  Pt does not currently have any pain  Pt was in pain Friday when she requested CT scan  Pain went away since, has not had any pain all weekend and still has no pain  Not sure if it passed  Flomax was completed and wanted to note that it did not give her any headaches  Wants to know if she should still go for CT to see if there is anything showing still? ?

## 2023-05-31 NOTE — TELEPHONE ENCOUNTER
Pt is aware and thanks you so much for helping  Advised to let us know if any pain or symptoms come back

## 2023-05-31 NOTE — TELEPHONE ENCOUNTER
----- Message from Manuel Story DO sent at 5/31/2023  9:10 AM EDT -----  Urine showed red blood cells, epithelial cells, and calcium oxalate crystals which means her symptoms are due to kidney stones  It looks like she did not have the CT done yet - is she still having symptoms or is she feeling better now? She should make sure to hydrate adequately by drinking a lot of water  If still not feeling better she should have the CT done and we can discuss medications to help her in the mean time

## 2023-06-13 ENCOUNTER — OFFICE VISIT (OUTPATIENT)
Dept: URGENT CARE | Facility: CLINIC | Age: 33
End: 2023-06-13
Payer: COMMERCIAL

## 2023-06-13 VITALS
HEIGHT: 57 IN | RESPIRATION RATE: 16 BRPM | SYSTOLIC BLOOD PRESSURE: 112 MMHG | TEMPERATURE: 98.5 F | WEIGHT: 112 LBS | DIASTOLIC BLOOD PRESSURE: 62 MMHG | HEART RATE: 84 BPM | BODY MASS INDEX: 24.16 KG/M2 | OXYGEN SATURATION: 95 %

## 2023-06-13 DIAGNOSIS — R51.9 ACUTE INTRACTABLE HEADACHE, UNSPECIFIED HEADACHE TYPE: Primary | ICD-10-CM

## 2023-06-13 PROCEDURE — 99213 OFFICE O/P EST LOW 20 MIN: CPT

## 2023-06-13 RX ORDER — KETOROLAC TROMETHAMINE 30 MG/ML
30 INJECTION, SOLUTION INTRAMUSCULAR; INTRAVENOUS ONCE
Status: COMPLETED | OUTPATIENT
Start: 2023-06-13 | End: 2023-06-13

## 2023-06-13 RX ADMIN — KETOROLAC TROMETHAMINE 30 MG: 30 INJECTION, SOLUTION INTRAMUSCULAR; INTRAVENOUS at 13:41

## 2023-06-13 NOTE — PROGRESS NOTES
[facility]        NAME: Sabi Lange is a 34 y o  female  : 1990    MRN: [id number]  DATE: 2023  TIME: 1:36 PM    Assessment and Plan   Acute intractable headache, unspecified headache type [R51 9]  1  Acute intractable headache, unspecified headache type  ketorolac (TORADOL) injection 30 mg        - Toradol IM      Patient Instructions       Follow up with PCP in 3-5 days  Proceed to  ER if symptoms worsen  Chief Complaint     Chief Complaint   Patient presents with   • Headache     Pt presents with headache x 3 days  Pt states pain is intermittent and states it is very sharp  She has tried ibuprofen, excedrin, ice packs, coffee and caffeine, and was drinking lots of water with no relief  History of Present Illness       33 y/o F presents for HA x 3 days  Located on neck/occipital area  Occurs intermittently  Described as sharp  Used IBU , excedrin, ice packs, coffee and caffeine, and was drinking lots of water with no relief  No light/sound sensitivity, changes in vision, watery eyes, significant stress, past of  Denies head truama  Review of Systems   Review of Systems   Constitutional: Negative for chills and fever  Eyes: Negative for pain and visual disturbance  Musculoskeletal: Negative for neck pain  Neurological: Positive for headaches  Negative for dizziness and speech difficulty           Current Medications       Current Outpatient Medications:   •  albuterol (2 5 mg/3 mL) 0 083 % nebulizer solution, Take 3 mL (2 5 mg total) by nebulization every 4 (four) hours as needed for wheezing or shortness of breath (Cough), Disp: 270 mL, Rfl: 2  •  ALPRAZolam (XANAX) 0 25 mg tablet, Take 1 tablet (0 25 mg total) by mouth 3 (three) times a day as needed for anxiety or sleep, Disp: 90 tablet, Rfl: 1  •  ibuprofen (MOTRIN) 400 mg tablet, Take 1 tablet every 6-8 hours after food as needed for headaches, pain, menstrual cramps, Disp: 60 tablet, Rfl: 1  • "valACYclovir (VALTREX) 1,000 mg tablet, TAKE 2 TABLETS BY MOUTH TWO TIMES A DAY FOR 1 DAYS, Disp: 4 tablet, Rfl: 5  •  cetirizine (ZyrTEC) 10 mg tablet, Take 1 tablet (10 mg total) by mouth daily (Patient not taking: Reported on 6/13/2023), Disp: 90 tablet, Rfl: 3  •  fluticasone (FLONASE) 50 mcg/act nasal spray, 2 sprays into each nostril daily (Patient not taking: Reported on 6/13/2023), Disp: 1 Bottle, Rfl: 5  •  methocarbamol (ROBAXIN) 500 mg tablet, Take 1 tablet (500 mg total) by mouth 4 (four) times a day (Patient not taking: Reported on 6/13/2023), Disp: 20 tablet, Rfl: 0  •  tamsulosin (FLOMAX) 0 4 mg, Take 1 capsule (0 4 mg total) by mouth daily with dinner (Patient not taking: Reported on 6/13/2023), Disp: 7 capsule, Rfl: 0    Current Facility-Administered Medications:   •  ketorolac (TORADOL) injection 30 mg, 30 mg, Intramuscular, Once, [facility], PA-C    Current Allergies     Allergies as of 06/13/2023 - Reviewed 06/13/2023   Allergen Reaction Noted   • Bactrim [sulfamethoxazole-trimethoprim] Headache and Drowsiness 07/19/2018            The following portions of the patient's history were reviewed and updated as appropriate: allergies, current medications, past family history, past medical history, past social history, past surgical history and problem list      Past Medical History:   Diagnosis Date   • Asthma        Past Surgical History:   Procedure Laterality Date   • CHOLECYSTECTOMY         Family History   Problem Relation Age of Onset   • Depression Mother    • Hypertension Mother    • Suicidality Father    • Depression Father    • Breast cancer Maternal Grandmother [de-identified]   • Heart attack Maternal Grandfather    • Stroke Paternal Grandfather          Medications have been verified  Objective   /62   Pulse 84   Temp 98 5 °F (36 9 °C)   Resp 16   Ht 4' 9\" (1 448 m)   Wt 50 8 kg (112 lb)   SpO2 95%   BMI 24 24 kg/m²   No LMP recorded  Patient has had an implant       " Physical Exam     Physical Exam  Vitals and nursing note reviewed  Constitutional:       General: She is not in acute distress  HENT:      Head: Normocephalic and atraumatic  Right Ear: Tympanic membrane, ear canal and external ear normal       Left Ear: Tympanic membrane, ear canal and external ear normal       Nose: Nose normal       Mouth/Throat:      Mouth: Mucous membranes are moist    Eyes:      Conjunctiva/sclera: Conjunctivae normal    Pulmonary:      Effort: Pulmonary effort is normal  No respiratory distress  Musculoskeletal:      Cervical back: Normal range of motion  No tenderness  Neurological:      Mental Status: She is alert  Cranial Nerves: No cranial nerve deficit        Gait: Gait normal    Psychiatric:         Mood and Affect: Mood normal          Behavior: Behavior normal

## 2023-06-13 NOTE — LETTER
June 13, 2023     Patient: Keely Garcia   YOB: 1990   Date of Visit: 6/13/2023       To Whom It May Concern: It is my medical opinion that Catalina Cohen {Work release (duty restriction):84017}  If you have any questions or concerns, please don't hesitate to call           Sincerely,        Diasy Harvey PA-C    CC: No Recipients

## 2023-06-13 NOTE — LETTER
June 13, 2023     Patient: Sangita Palmer   YOB: 1990   Date of Visit: 6/13/2023       To Whom it May Concern:    Xavier Roberts was seen in my clinic on 6/13/2023  She may return to work on 6/14   If you have any questions or concerns, please don't hesitate to call           Sincerely,          Erendira Garcia PA-C        CC: Sangita Palmer

## 2023-08-31 ENCOUNTER — TELEPHONE (OUTPATIENT)
Dept: FAMILY MEDICINE CLINIC | Facility: CLINIC | Age: 33
End: 2023-08-31

## 2023-08-31 ENCOUNTER — OFFICE VISIT (OUTPATIENT)
Dept: FAMILY MEDICINE CLINIC | Facility: CLINIC | Age: 33
End: 2023-08-31
Payer: COMMERCIAL

## 2023-08-31 VITALS
RESPIRATION RATE: 16 BRPM | HEIGHT: 57 IN | DIASTOLIC BLOOD PRESSURE: 80 MMHG | BODY MASS INDEX: 24.81 KG/M2 | TEMPERATURE: 97.9 F | SYSTOLIC BLOOD PRESSURE: 128 MMHG | WEIGHT: 115 LBS

## 2023-08-31 DIAGNOSIS — J45.20 MILD INTERMITTENT REACTIVE AIRWAY DISEASE WITHOUT COMPLICATION: ICD-10-CM

## 2023-08-31 DIAGNOSIS — J30.9 ALLERGIC RHINITIS, UNSPECIFIED SEASONALITY, UNSPECIFIED TRIGGER: ICD-10-CM

## 2023-08-31 DIAGNOSIS — B00.1 RECURRENT COLD SORES: ICD-10-CM

## 2023-08-31 DIAGNOSIS — J45.20 MILD INTERMITTENT REACTIVE AIRWAY DISEASE WITHOUT COMPLICATION: Primary | ICD-10-CM

## 2023-08-31 DIAGNOSIS — J01.91 ACUTE RECURRENT SINUSITIS, UNSPECIFIED LOCATION: Primary | ICD-10-CM

## 2023-08-31 PROCEDURE — 99213 OFFICE O/P EST LOW 20 MIN: CPT | Performed by: FAMILY MEDICINE

## 2023-08-31 RX ORDER — FLUTICASONE PROPIONATE 50 MCG
2 SPRAY, SUSPENSION (ML) NASAL DAILY
Qty: 16 G | Refills: 11 | Status: SHIPPED | OUTPATIENT
Start: 2023-08-31

## 2023-08-31 RX ORDER — VALACYCLOVIR HYDROCHLORIDE 1 G/1
TABLET, FILM COATED ORAL
Qty: 4 TABLET | Refills: 5 | Status: SHIPPED | OUTPATIENT
Start: 2023-08-31 | End: 2024-12-17

## 2023-08-31 RX ORDER — FLUTICASONE PROPIONATE AND SALMETEROL 250; 50 UG/1; UG/1
1 POWDER RESPIRATORY (INHALATION) 2 TIMES DAILY
Qty: 60 BLISTER | Refills: 3 | Status: SHIPPED | OUTPATIENT
Start: 2023-08-31

## 2023-08-31 RX ORDER — FLUTICASONE PROPIONATE AND SALMETEROL XINAFOATE 115; 21 UG/1; UG/1
2 AEROSOL, METERED RESPIRATORY (INHALATION) 2 TIMES DAILY
Qty: 12 G | Refills: 3 | Status: SHIPPED | OUTPATIENT
Start: 2023-08-31

## 2023-08-31 RX ORDER — AZITHROMYCIN 250 MG/1
TABLET, FILM COATED ORAL
Qty: 6 TABLET | Refills: 0 | Status: SHIPPED | OUTPATIENT
Start: 2023-08-31 | End: 2023-09-05

## 2023-08-31 NOTE — PATIENT INSTRUCTIONS
Please continue on once a day Zyrtec, add Flonase. Do not use Afrin it causes rebound congestion.   Flonase can be used on a daily basis  Start Advair discus, 1 puff twice a day, continue nebulizer every 4-6 hours as needed  Zithromax for 5 days  Okay to continue on Tylenol/ibuprofen as needed  Delsym for cough OTC

## 2023-08-31 NOTE — PROGRESS NOTES
Name: Trinh Moon      : 1990      MRN: [id number]  Encounter Provider: Vipin Doss MD  Encounter Date: 2023   Encounter department: [address] [place] [address]     1. Acute recurrent sinusitis, unspecified location  -     azithromycin (Zithromax) 250 mg tablet; Take 2 tablets (500 mg total) by mouth daily for 1 day, THEN 1 tablet (250 mg total) daily for 4 days. 2. Mild intermittent reactive airway disease without complication  -     Fluticasone-Salmeterol (Advair Diskus) 250-50 mcg/dose inhaler; Inhale 1 puff 2 (two) times a day Rinse mouth after use. 3. Allergic rhinitis, unspecified seasonality, unspecified trigger  -     fluticasone (FLONASE) 50 mcg/act nasal spray; 2 sprays into each nostril daily    4. Recurrent cold sores  -     valACYclovir (VALTREX) 1,000 mg tablet; TAKE 2 TABLETS BY MOUTH TWO TIMES A DAY FOR 1 DAYS      Patient Instructions   Please continue on once a day Zyrtec, add Flonase. Do not use Afrin it causes rebound congestion. Flonase can be used on a daily basis  Start Advair discus, 1 puff twice a day, continue nebulizer every 4-6 hours as needed  Zithromax for 5 days  Okay to continue on Tylenol/ibuprofen as needed  Delsym for cough OTC             Subjective       Cold s/o since Monday night,  Painful sore throat, improved somewhat   Took Amoxil  - left overs x 2 days   Fever  -  chills - last few days  Coughing, dry -occasional yellow mucus expectoration  Chest is tight     COVID test is negative  X 3-4 times     on Zyrtec daily        Review of Systems   Constitutional: Positive for fatigue and fever. HENT: Positive for congestion and sore throat. Respiratory: Positive for cough and chest tightness. Cardiovascular: Negative. Endocrine: Negative. Allergic/Immunologic: Positive for environmental allergies. Neurological: Negative. Hematological: Negative. Psychiatric/Behavioral: Negative.         Past Medical History:   Diagnosis Date   • Asthma      Past Surgical History:   Procedure Laterality Date   • CHOLECYSTECTOMY       Family History   Problem Relation Age of Onset   • Depression Mother    • Hypertension Mother    • Suicidality Father    • Depression Father    • Breast cancer Maternal Grandmother 80   • Heart attack Maternal Grandfather    • Stroke Paternal Grandfather      Social History     Socioeconomic History   • Marital status: Single     Spouse name: None   • Number of children: None   • Years of education: None   • Highest education level: None   Occupational History   • None   Tobacco Use   • Smoking status: Former   • Smokeless tobacco: Never   • Tobacco comments:     never a smoker, per [employer]   Vaping Use   • Vaping Use: Every day   • Substances: Nicotine, Flavoring   Substance and Sexual Activity   • Alcohol use: Yes     Comment: social   • Drug use: No   • Sexual activity: Yes     Partners: Male   Other Topics Concern   • None   Social History Narrative   • None     Social Determinants of Health     Financial Resource Strain: Not on file   Food Insecurity: Not on file   Transportation Needs: Not on file   Physical Activity: Not on file   Stress: Not on file   Social Connections: Not on file   Intimate Partner Violence: Not on file   Housing Stability: Not on file     Current Outpatient Medications on File Prior to Visit   Medication Sig   • ALPRAZolam (XANAX) 0.25 mg tablet Take 1 tablet (0.25 mg total) by mouth 3 (three) times a day as needed for anxiety or sleep   • ibuprofen (MOTRIN) 400 mg tablet Take 1 tablet every 6-8 hours after food as needed for headaches, pain, menstrual cramps   • albuterol (2.5 mg/3 mL) 0.083 % nebulizer solution Take 3 mL (2.5 mg total) by nebulization every 4 (four) hours as needed for wheezing or shortness of breath (Cough) (Patient not taking: Reported on 8/31/2023)   • cetirizine (ZyrTEC) 10 mg tablet Take 1 tablet (10 mg total) by mouth daily (Patient not taking: Reported on 6/13/2023)   • methocarbamol (ROBAXIN) 500 mg tablet Take 1 tablet (500 mg total) by mouth 4 (four) times a day (Patient not taking: Reported on 6/13/2023)     Allergies   Allergen Reactions   • Bactrim [Sulfamethoxazole-Trimethoprim] Headache and Drowsiness       There is no immunization history on file for this patient. Objective     /80 (BP Location: Left arm, Patient Position: Sitting, Cuff Size: Standard)   Temp 97.9 °F (36.6 °C) (Temporal)   Resp 16   Ht 4' 9" (1.448 m)   Wt 52.2 kg (115 lb)   BMI 24.89 kg/m²     Physical Exam  Vitals and nursing note reviewed. Constitutional:       General: She is not in acute distress. Appearance: Normal appearance. She is well-developed. She is not ill-appearing. HENT:      Head: Normocephalic and atraumatic. Right Ear: A middle ear effusion is present. Tympanic membrane is not erythematous. Left Ear: A middle ear effusion is present. Tympanic membrane is not erythematous. Nose: Mucosal edema present. Mouth/Throat:      Mouth: Mucous membranes are moist.      Pharynx: Posterior oropharyngeal erythema present. No oropharyngeal exudate. Comments: Cobblestone, erythema  Eyes:      Conjunctiva/sclera: Conjunctivae normal.   Cardiovascular:      Rate and Rhythm: Normal rate and regular rhythm. Heart sounds: Normal heart sounds. Pulmonary:      Effort: Pulmonary effort is normal. Prolonged expiration present. No respiratory distress. Breath sounds: Normal breath sounds. No wheezing or rales. Musculoskeletal:      Cervical back: Neck supple. Skin:     General: Skin is warm. Neurological:      Mental Status: She is alert and oriented to person, place, and time. Cranial Nerves: No cranial nerve deficit. Deep Tendon Reflexes: Reflexes are normal and symmetric. Psychiatric:         Mood and Affect: Mood normal.         Behavior: Behavior normal.         Thought Content:  Thought content normal.       Dolores Rhoades MD

## 2023-08-31 NOTE — TELEPHONE ENCOUNTER
Pt calling, saw Dr Fernando Ramirez today she is wondering if there is any other options for inhaler that is not powder form.  States the Advair powder makes her nauseous, please advise

## 2023-09-15 DIAGNOSIS — F41.0 PANIC ATTACKS: ICD-10-CM

## 2023-09-15 RX ORDER — ALPRAZOLAM 0.25 MG/1
0.25 TABLET ORAL 3 TIMES DAILY PRN
Qty: 90 TABLET | Refills: 1 | Status: SHIPPED | OUTPATIENT
Start: 2023-09-15

## 2023-09-15 NOTE — TELEPHONE ENCOUNTER
Patient called regarding this, is having bad anxiety today.  Assured patient provider has refill request and will address

## 2023-09-17 RX ORDER — ALPRAZOLAM 0.25 MG/1
TABLET ORAL
Qty: 90 TABLET | Refills: 1 | OUTPATIENT
Start: 2023-09-17

## 2024-02-21 PROBLEM — J01.91 ACUTE RECURRENT SINUSITIS: Status: RESOLVED | Noted: 2019-01-15 | Resolved: 2024-02-21

## 2024-03-25 DIAGNOSIS — F41.0 PANIC ATTACKS: ICD-10-CM

## 2024-03-25 RX ORDER — ALPRAZOLAM 0.25 MG/1
0.25 TABLET ORAL 3 TIMES DAILY PRN
Qty: 90 TABLET | Refills: 1 | Status: SHIPPED | OUTPATIENT
Start: 2024-03-25

## 2024-09-01 ENCOUNTER — NURSE TRIAGE (OUTPATIENT)
Dept: OTHER | Facility: OTHER | Age: 34
End: 2024-09-01

## 2024-09-01 ENCOUNTER — OFFICE VISIT (OUTPATIENT)
Dept: URGENT CARE | Age: 34
End: 2024-09-01
Payer: COMMERCIAL

## 2024-09-01 VITALS
SYSTOLIC BLOOD PRESSURE: 139 MMHG | RESPIRATION RATE: 20 BRPM | BODY MASS INDEX: 25.43 KG/M2 | OXYGEN SATURATION: 97 % | TEMPERATURE: 98.3 F | HEART RATE: 61 BPM | WEIGHT: 117.5 LBS | DIASTOLIC BLOOD PRESSURE: 80 MMHG

## 2024-09-01 DIAGNOSIS — J06.9 ACUTE URI: Primary | ICD-10-CM

## 2024-09-01 DIAGNOSIS — H69.93 ETD (EUSTACHIAN TUBE DYSFUNCTION), BILATERAL: ICD-10-CM

## 2024-09-01 DIAGNOSIS — B00.1 RECURRENT COLD SORES: ICD-10-CM

## 2024-09-01 DIAGNOSIS — J02.9 SORE THROAT: ICD-10-CM

## 2024-09-01 LAB — S PYO AG THROAT QL: NEGATIVE

## 2024-09-01 PROCEDURE — 99213 OFFICE O/P EST LOW 20 MIN: CPT

## 2024-09-01 PROCEDURE — 87070 CULTURE OTHR SPECIMN AEROBIC: CPT

## 2024-09-01 PROCEDURE — 87880 STREP A ASSAY W/OPTIC: CPT

## 2024-09-01 RX ORDER — VALACYCLOVIR HYDROCHLORIDE 1 G/1
2000 TABLET, FILM COATED ORAL 2 TIMES DAILY
Qty: 4 TABLET | Refills: 0 | Status: SHIPPED | OUTPATIENT
Start: 2024-09-01 | End: 2024-09-03 | Stop reason: SDUPTHER

## 2024-09-01 RX ORDER — VALACYCLOVIR HYDROCHLORIDE 1 G/1
2000 TABLET, FILM COATED ORAL 2 TIMES DAILY
Qty: 4 TABLET | Refills: 0 | Status: SHIPPED | OUTPATIENT
Start: 2024-09-01 | End: 2024-09-01

## 2024-09-01 RX ORDER — PREDNISONE 10 MG/1
10 TABLET ORAL DAILY
Qty: 5 TABLET | Refills: 0 | Status: SHIPPED | OUTPATIENT
Start: 2024-09-01 | End: 2024-09-03 | Stop reason: SDUPTHER

## 2024-09-01 RX ORDER — PREDNISONE 10 MG/1
10 TABLET ORAL DAILY
Qty: 5 TABLET | Refills: 0 | Status: SHIPPED | OUTPATIENT
Start: 2024-09-01 | End: 2024-09-01

## 2024-09-01 NOTE — TELEPHONE ENCOUNTER
"Regarding: Severe sinus infection/ Pain  ----- Message from Denice VANN sent at 9/1/2024  4:08 PM EDT -----  Pt stated, \" I have a severe sinus infection, I feel like my head is going to explode and I can not take it.\"    "

## 2024-09-01 NOTE — TELEPHONE ENCOUNTER
"Reason for Disposition  • [1] Redness or swelling on the cheek, forehead or around the eye AND [2] no fever    Answer Assessment - Initial Assessment Questions  1. LOCATION: \"Where does it hurt?\"       Whole head     2. ONSET: \"When did the sinus pain start?\"  (e.g., hours, days)       24 hours ago     3. SEVERITY: \"How bad is the pain?\"   (Scale 1-10; mild, moderate or severe)    - MILD (1-3): doesn't interfere with normal activities     - MODERATE (4-7): interferes with normal activities (e.g., work or school) or awakens from sleep    - SEVERE (8-10): excruciating pain and patient unable to do any normal activities         7/10    4. RECURRENT SYMPTOM: \"Have you ever had sinus problems before?\" If Yes, ask: \"When was the last time?\" and \"What happened that time?\"       Yes, hx of sinusitis     5. NASAL CONGESTION: \"Is the nose blocked?\" If Yes, ask: \"Can you open it or must you breathe through the mouth?\"      Yes    6. NASAL DISCHARGE: \"Do you have discharge from your nose?\" If so ask, \"What color?\"      Yes    7. FEVER: \"Do you have a fever?\" If Yes, ask: \"What is it, how was it measured, and when did it start?\"       Last night, none today    8. OTHER SYMPTOMS: \"Do you have any other symptoms?\" (e.g., sore throat, cough, earache, difficulty breathing)    Whole face is swollen,head feels like its going to explode with mucous    Covid test negative     Patient was seen in Urgent Care and was not prescribed an antibiotic. Patient has a hx of sinusitis and is looking for an antibiotic to be prescribed.  On call provider stated should follow up with office on Tuesday if symptoms persist. Patient notified and verbalized understanding.    Protocols used: Sinus Pain or Congestion-ADULT-AH    "

## 2024-09-01 NOTE — TELEPHONE ENCOUNTER
"Regarding: Very bad Sinus Infection  ----- Message from Cristine DAMON sent at 9/1/2024  6:09 PM EDT -----  \" I have a very bad Sinus Infection, My Whole Face is swollen, The Mucus is not moving. My head feels like it's going to explode.\"    "

## 2024-09-01 NOTE — PATIENT INSTRUCTIONS
Your symptoms are most likely related to a viral infection.  Viral infections can sometimes last for 10-14 days and symptoms are usually most intense at days 3 through 5. Your symptoms should begin to improve after 1 week. You may use over-the-counter cough and cold medication such as Mucinex for cough and chest congestion.  We recommend trialing warm salt water gargles, warm tea with honey, Chloraseptic spray or Cepacol cough drops as needed for sore throat.  It is also recommended to ensure adequate fluid intake. Flonase may help with congestion and post nasal drip. You may alternate Motrin and Tylenol as needed for fever and pain.  If your symptoms persist, please follow-up with your primary care provider for further evaluation.  If your symptoms worsen, or you develop new, worsening or concerning symptoms, please go to the ER for further workup and evaluation.    Please take Prednisone daily with meals for 5 days.   Please trial warm salt water gargles, Chloraseptic spray, Cepacol cough drops and warm tea with honey as needed for sore throat.  Continue with OTC cough and cold medication.   Drink plenty of fluids.   Follow up with PCP in 3-5 days.  Proceed to  ER if symptoms worsen.    If tests are performed, our office will contact you with results only if changes need to made to the care plan discussed with you at the visit. You can review your full results on [facility].

## 2024-09-01 NOTE — PROGRESS NOTES
[facility]        NAME: Shani Chase is a 34 y.o. female  : 1990    MRN: [id number]  DATE: 2024  TIME: 6:15 PM    Assessment and Plan   Acute URI [J06.9]  1. Acute URI        2. Recurrent cold sores  valACYclovir (VALTREX) 1,000 mg tablet    DISCONTINUED: valACYclovir (VALTREX) 1,000 mg tablet      3. Sore throat  POCT rapid ANTIGEN strepA    Throat culture    Throat culture      4. ETD (Eustachian tube dysfunction), bilateral  predniSONE 10 mg tablet    DISCONTINUED: predniSONE 10 mg tablet        Patient is concerned for acute bacterial upper respiratory infection and is requesting antibiotics for current symptoms.  Discussed at length with patient that symptoms are most likely related to a viral infection, and unfortunately antibiotics will not treat a virus. Educated patient that antibiotics can be harmful when prescribed inappropriately and over-prescribing can lessen the efficacy when needed to treat a bacterial infection.  Discussed with patient that rapid strep is negative, we will send out throat culture to rule out Strep infection.  Given that patient is taking allergy medication and other over-the-counter decongestant with minimal relief, will trial a short course of prednisone for eustachian tube dysfunction and sinus pressure.  Patient requesting Valtrex for cold sores as patient has a history of recurrent cold sores with upper respiratory infections. Educated that cold sores are caused by a virus, medication refill sent to pharmacy as requested. Discussed with patient that if her symptoms are persisting over the next few days, she may contact the office for further treatment plan. Patient to be contacted if throat culture is positive. Patient left exam room before given AVS.     Patient Instructions     Please take Prednisone daily with meals for 5 days.   Please trial warm salt water gargles, Chloraseptic spray, Cepacol cough drops and warm tea with honey as needed  "for sore throat.  Continue with OTC cough and cold medication.   Drink plenty of fluids.   Follow up with PCP in 3-5 days.  Proceed to  ER if symptoms worsen.    If tests are performed, our office will contact you with results only if changes need to made to the care plan discussed with you at the visit. You can review your full results on [facility].    Chief Complaint     Chief Complaint   Patient presents with    Facial Pain     Started with scratchy throat yesterday. Today \" severely\" congestion, eye were swollen. Reports b/l above eye pressure . Taking ibuprofen / mucinex dm to help with symptoms. Reports sweats and chills. Home covid test this morning reported as negative.     Sore Throat    Headache         History of Present Illness       34-year-old female presenting for evaluation of upper respiratory symptoms.  Patient states over the past day she has been experiencing chills, congestion, sinus pain, sore throat, nausea and headaches.  She notes that she has felt feverish, but denies checking her temperature.  She has been taking allergy medication, Motrin, Mucinex DM with minimal relief of her symptoms.  She took a home COVID test with negative test results.  She denies any chest pain, shortness of breath, vomiting or diarrhea. Patient states that she has a history of recurrent cold sores when she is ill with upper respiratory infections.     Sore Throat   Associated symptoms include congestion and headaches. Pertinent negatives include no coughing, diarrhea, shortness of breath or vomiting.   Headache      Review of Systems   Review of Systems   Constitutional:  Positive for chills and fever.   HENT:  Positive for congestion, sinus pain and sore throat.    Respiratory:  Negative for cough and shortness of breath.    Cardiovascular:  Negative for chest pain.   Gastrointestinal:  Positive for nausea. Negative for diarrhea and vomiting.   Neurological:  Positive for headaches.   All other systems " reviewed and are negative.        Current Medications       Current Outpatient Medications:     albuterol (2.5 mg/3 mL) 0.083 % nebulizer solution, Take 3 mL (2.5 mg total) by nebulization every 4 (four) hours as needed for wheezing or shortness of breath (Cough), Disp: 270 mL, Rfl: 2    ALPRAZolam (XANAX) 0.25 mg tablet, Take 1 tablet (0.25 mg total) by mouth 3 (three) times a day as needed for anxiety or sleep, Disp: 90 tablet, Rfl: 1    cetirizine (ZyrTEC) 10 mg tablet, Take 1 tablet (10 mg total) by mouth daily, Disp: 90 tablet, Rfl: 3    fluticasone (FLONASE) 50 mcg/act nasal spray, 2 sprays into each nostril daily, Disp: 16 g, Rfl: 11    Fluticasone-Salmeterol (Advair Diskus) 250-50 mcg/dose inhaler, Inhale 1 puff 2 (two) times a day Rinse mouth after use., Disp: 60 blister, Rfl: 3    fluticasone-salmeterol (Advair HFA) 115-21 MCG/ACT inhaler, Inhale 2 puffs 2 (two) times a day Rinse mouth after use., Disp: 12 g, Rfl: 3    ibuprofen (MOTRIN) 400 mg tablet, Take 1 tablet every 6-8 hours after food as needed for headaches, pain, menstrual cramps, Disp: 60 tablet, Rfl: 1    predniSONE 10 mg tablet, Take 1 tablet (10 mg total) by mouth daily for 5 days, Disp: 5 tablet, Rfl: 0    valACYclovir (VALTREX) 1,000 mg tablet, TAKE 2 TABLETS BY MOUTH TWO TIMES A DAY FOR 1 DAYS, Disp: 4 tablet, Rfl: 5    valACYclovir (VALTREX) 1,000 mg tablet, Take 2 tablets (2,000 mg total) by mouth 2 (two) times a day for 1 day, Disp: 4 tablet, Rfl: 0    methocarbamol (ROBAXIN) 500 mg tablet, Take 1 tablet (500 mg total) by mouth 4 (four) times a day (Patient not taking: Reported on 9/1/2024), Disp: 20 tablet, Rfl: 0    Current Allergies     Allergies as of 09/01/2024 - Reviewed 09/01/2024   Allergen Reaction Noted    Bactrim [sulfamethoxazole-trimethoprim] Headache and Drowsiness 07/19/2018            The following portions of the patient's history were reviewed and updated as appropriate: allergies, current medications, past family  history, past medical history, past social history, past surgical history and problem list.     Past Medical History:   Diagnosis Date    Asthma        Past Surgical History:   Procedure Laterality Date    CHOLECYSTECTOMY         Family History   Problem Relation Age of Onset    Depression Mother     Hypertension Mother     Suicidality Father     Depression Father     Breast cancer Maternal Grandmother 80    Heart attack Maternal Grandfather     Stroke Paternal Grandfather          Medications have been verified.        Objective   /80   Pulse 61   Temp 98.3 °F (36.8 °C) (Oral)   Resp 20   Wt 53.3 kg (117 lb 8 oz)   SpO2 97%   BMI 25.43 kg/m²        Physical Exam     Physical Exam  Vitals and nursing note reviewed.   Constitutional:       General: She is not in acute distress.     Appearance: Normal appearance. She is normal weight. She is not ill-appearing, toxic-appearing or diaphoretic.   HENT:      Head: Normocephalic and atraumatic.      Right Ear: A middle ear effusion is present.      Left Ear: A middle ear effusion is present.      Ears:      Comments: Small amount of serous fluid posterior bilateral tympanic membrane     Nose: Congestion present. No rhinorrhea.      Mouth/Throat:      Mouth: Mucous membranes are moist.      Pharynx: Oropharynx is clear. Postnasal drip present. No oropharyngeal exudate or posterior oropharyngeal erythema.      Comments: Cobblestone pattern posterior pharynx   Eyes:      General:         Right eye: No discharge.         Left eye: No discharge.   Cardiovascular:      Rate and Rhythm: Normal rate and regular rhythm.      Pulses: Normal pulses.      Heart sounds: Normal heart sounds. No murmur heard.     No friction rub. No gallop.   Pulmonary:      Effort: Pulmonary effort is normal. No respiratory distress.      Breath sounds: Normal breath sounds. No stridor. No wheezing, rhonchi or rales.   Chest:      Chest wall: No tenderness.   Abdominal:      General: Bowel  sounds are normal.      Palpations: Abdomen is soft.      Tenderness: There is no abdominal tenderness.   Skin:     General: Skin is warm and dry.   Neurological:      Mental Status: She is alert.   Psychiatric:         Mood and Affect: Mood normal.         Behavior: Behavior normal.

## 2024-09-01 NOTE — TELEPHONE ENCOUNTER
"Reason for Disposition  • [1] Redness or swelling on the cheek, forehead or around the eye AND [2] no fever    Answer Assessment - Initial Assessment Questions  1. LOCATION: \"Where does it hurt?\"       Sinuses- face     2. ONSET: \"When did the sinus pain start?\"  (e.g., hours, days)       Since yesterday    3. SEVERITY: \"How bad is the pain?\"   (Scale 1-10; mild, moderate or severe)    - MILD (1-3): doesn't interfere with normal activities     - MODERATE (4-7): interferes with normal activities (e.g., work or school) or awakens from sleep    - SEVERE (8-10): excruciating pain and patient unable to do any normal activities         8/10 facial pain. Also having swelling to face and sinuses. Denies redness.     4. RECURRENT SYMPTOM: \"Have you ever had sinus problems before?\" If Yes, ask: \"When was the last time?\" and \"What happened that time?\"       Usually during change of season    5. NASAL CONGESTION: \"Is the nose blocked?\" If Yes, ask: \"Can you open it or must you breathe through the mouth?\"      Very congested- clear    6. NASAL DISCHARGE: \"Do you have discharge from your nose?\" If so ask, \"What color?\"      Yes    7. FEVER: \"Do you have a fever?\" If Yes, ask: \"What is it, how was it measured, and when did it start?\"       Felt feverish last nigh- unable to check temp. Fever free since this morning.     8. OTHER SYMPTOMS: \"Do you have any other symptoms?\" (e.g., sore throat, cough, earache, difficulty breathing)      Sore throat and ear pain. At home COVID test was negative.     9. PREGNANCY: \"Is there any chance you are pregnant?\" \"When was your last menstrual period?\"      Denies    Protocols used: Sinus Pain or Congestion-ADULT-AH    "

## 2024-09-03 ENCOUNTER — TELEMEDICINE (OUTPATIENT)
Dept: FAMILY MEDICINE CLINIC | Facility: CLINIC | Age: 34
End: 2024-09-03
Payer: COMMERCIAL

## 2024-09-03 VITALS — HEIGHT: 57 IN | WEIGHT: 117 LBS | BODY MASS INDEX: 25.24 KG/M2

## 2024-09-03 DIAGNOSIS — B00.1 RECURRENT COLD SORES: ICD-10-CM

## 2024-09-03 DIAGNOSIS — J45.20 MILD INTERMITTENT REACTIVE AIRWAY DISEASE WITHOUT COMPLICATION: ICD-10-CM

## 2024-09-03 DIAGNOSIS — H69.93 ETD (EUSTACHIAN TUBE DYSFUNCTION), BILATERAL: ICD-10-CM

## 2024-09-03 DIAGNOSIS — J98.01 BRONCHOSPASM: ICD-10-CM

## 2024-09-03 LAB — BACTERIA THROAT CULT: NORMAL

## 2024-09-03 PROCEDURE — 99213 OFFICE O/P EST LOW 20 MIN: CPT | Performed by: FAMILY MEDICINE

## 2024-09-03 RX ORDER — PREDNISONE 10 MG/1
40 TABLET ORAL DAILY
Qty: 20 TABLET | Refills: 0 | Status: SHIPPED | OUTPATIENT
Start: 2024-09-03 | End: 2024-09-08

## 2024-09-03 RX ORDER — ALBUTEROL SULFATE 0.83 MG/ML
2.5 SOLUTION RESPIRATORY (INHALATION) EVERY 4 HOURS PRN
Qty: 270 ML | Refills: 2 | Status: SHIPPED | OUTPATIENT
Start: 2024-09-03

## 2024-09-03 RX ORDER — FLUTICASONE PROPIONATE AND SALMETEROL 250; 50 UG/1; UG/1
1 POWDER RESPIRATORY (INHALATION) 2 TIMES DAILY
Qty: 60 BLISTER | Refills: 3 | Status: SHIPPED | OUTPATIENT
Start: 2024-09-03

## 2024-09-03 RX ORDER — VALACYCLOVIR HYDROCHLORIDE 1 G/1
2000 TABLET, FILM COATED ORAL 2 TIMES DAILY
Qty: 4 TABLET | Refills: 0 | Status: SHIPPED | OUTPATIENT
Start: 2024-09-03 | End: 2024-09-04

## 2024-09-03 NOTE — PROGRESS NOTES
Virtual Regular Visit  Name: Shani Chase      : 1990      MRN: [id number]  Encounter Provider: Robert Nieves DO  Encounter Date: 9/3/2024   Encounter department: [facility]    Verification of patient location:    Patient is located at Home in the following state in which I hold an active license [place]    Assessment & Plan   1. Bronchospasm  -     albuterol (2.5 mg/3 mL) 0.083 % nebulizer solution; Take 3 mL (2.5 mg total) by nebulization every 4 (four) hours as needed for wheezing or shortness of breath (Cough)  2. Mild intermittent reactive airway disease without complication  -     Fluticasone-Salmeterol (Advair Diskus) 250-50 mcg/dose inhaler; Inhale 1 puff 2 (two) times a day Rinse mouth after use.  3. ETD (Eustachian tube dysfunction), bilateral  -     predniSONE 10 mg tablet; Take 4 tablets (40 mg total) by mouth daily for 5 days  4. Recurrent cold sores  -     valACYclovir (VALTREX) 1,000 mg tablet; Take 2 tablets (2,000 mg total) by mouth 2 (two) times a day for 1 day       Encounter provider Robert Nieves DO    The patient was identified by name and date of birth. Shani Chase was informed that this is a telemedicine visit and that the visit is being conducted through the Epic Embedded platform. She agrees to proceed..  My office door was closed. No one else was in the room.  She acknowledged consent and understanding of privacy and security of the video platform. The patient has agreed to participate and understands they can discontinue the visit at any time.    Patient is aware this is a billable service.     History of Present Illness     URI         Patient was evaluated at urgent care 2 days ago.     Onset of symptoms 3 days ago with sore throat and head pressure. Was having fevers the last three days, none in the last 24 hours. Took three COVID tests three days in a row, most recently yesterday, all were negative. Strep test negative at urgent care, as was throat  "culture. Current symptoms are head pressure and ear pressure, frontal sinus tenderness, chest tightness. Taking sinus/congestion medication which is not especially helpful. Has been using nebulizers and inhalers, feeling tight in her chest.    Needs refills of inhaler and neb solution.     Review of Systems  Current Outpatient Medications on File Prior to Visit   Medication Sig Dispense Refill   • ALPRAZolam (XANAX) 0.25 mg tablet Take 1 tablet (0.25 mg total) by mouth 3 (three) times a day as needed for anxiety or sleep 90 tablet 1   • cetirizine (ZyrTEC) 10 mg tablet Take 1 tablet (10 mg total) by mouth daily 90 tablet 3   • fluticasone (FLONASE) 50 mcg/act nasal spray 2 sprays into each nostril daily 16 g 11   • ibuprofen (MOTRIN) 400 mg tablet Take 1 tablet every 6-8 hours after food as needed for headaches, pain, menstrual cramps 60 tablet 1   • valACYclovir (VALTREX) 1,000 mg tablet TAKE 2 TABLETS BY MOUTH TWO TIMES A DAY FOR 1 DAYS 4 tablet 5   • methocarbamol (ROBAXIN) 500 mg tablet Take 1 tablet (500 mg total) by mouth 4 (four) times a day (Patient not taking: Reported on 9/1/2024) 20 tablet 0     No current facility-administered medications on file prior to visit.      Social History     Tobacco Use   • Smoking status: Former   • Smokeless tobacco: Never   • Tobacco comments:     never a smoker, per [employer]   Vaping Use   • Vaping status: Every Day   • Substances: Nicotine, Flavoring   Substance and Sexual Activity   • Alcohol use: Yes     Comment: social   • Drug use: No   • Sexual activity: Yes     Partners: Male     Objective     Ht 4' 9\" (1.448 m)   Wt 53.1 kg (117 lb)   BMI 25.32 kg/m²   Physical Exam  Vitals reviewed.   Constitutional:       Appearance: Normal appearance.   Pulmonary:      Effort: Pulmonary effort is normal.   Neurological:      Mental Status: She is alert.   Psychiatric:         Mood and Affect: Mood normal.         Behavior: Behavior normal.         Visit Time  Total Visit " Duration: 15

## 2024-10-08 DIAGNOSIS — F41.0 PANIC ATTACKS: ICD-10-CM

## 2024-10-08 RX ORDER — ALPRAZOLAM 0.25 MG
0.25 TABLET ORAL 3 TIMES DAILY PRN
Qty: 90 TABLET | Refills: 0 | Status: SHIPPED | OUTPATIENT
Start: 2024-10-08 | End: 2024-10-10 | Stop reason: SDUPTHER

## 2024-10-10 ENCOUNTER — TELEPHONE (OUTPATIENT)
Age: 34
End: 2024-10-10

## 2024-10-10 DIAGNOSIS — F41.0 PANIC ATTACKS: ICD-10-CM

## 2024-10-10 RX ORDER — ALPRAZOLAM 0.25 MG
0.25 TABLET ORAL 3 TIMES DAILY PRN
Qty: 90 TABLET | Refills: 0 | Status: SHIPPED | OUTPATIENT
Start: 2024-10-10

## 2024-10-10 NOTE — TELEPHONE ENCOUNTER
Patient is requesting that her prescription for   ALPRAZolam (XANAX) 0.25 mg tablet [[id number]]    Order Details  Dose: 0.25 mg Route: Oral Frequency: 3 times daily PRN for anxiety, sleep   Dispense Quantity: 90 tablet Refills: 0      Should be send to [facility].  Patient does not have any medication left.  Please let her know when the prescription is sent over.    Please delete [facility] from patients record.      Thank you!!

## 2024-10-23 NOTE — Clinical Note
Please process a Mirena IUD for this patient  She will be returning at the end of October for insertion 
no

## 2024-12-05 ENCOUNTER — TELEMEDICINE (OUTPATIENT)
Dept: FAMILY MEDICINE CLINIC | Facility: CLINIC | Age: 34
End: 2024-12-05
Payer: COMMERCIAL

## 2024-12-05 DIAGNOSIS — J06.9 UPPER RESPIRATORY TRACT INFECTION, UNSPECIFIED TYPE: ICD-10-CM

## 2024-12-05 DIAGNOSIS — J98.01 BRONCHOSPASM: Primary | ICD-10-CM

## 2024-12-05 PROCEDURE — 99213 OFFICE O/P EST LOW 20 MIN: CPT | Performed by: FAMILY MEDICINE

## 2024-12-05 RX ORDER — AZITHROMYCIN 250 MG/1
TABLET, FILM COATED ORAL
Qty: 6 TABLET | Refills: 0 | Status: SHIPPED | OUTPATIENT
Start: 2024-12-05 | End: 2024-12-09

## 2024-12-05 RX ORDER — METHYLPREDNISOLONE 4 MG/1
TABLET ORAL
Qty: 21 EACH | Refills: 0 | Status: SHIPPED | OUTPATIENT
Start: 2024-12-05

## 2024-12-05 NOTE — PROGRESS NOTES
Virtual Regular Visit  Name: Shani Chase      : 1990      MRN: [id number]  Encounter Provider: Anyi Crawford MD  Encounter Date: 2024   Encounter department: [facility]      Verification of patient location:  Patient is located at Home in the following state in which I hold an active license [place] :  Assessment & Plan  Bronchospasm  Continue Advair discus 250/50 1 puff twice daily along with albuterol via nebulizer every 4-6 hours       Upper respiratory tract infection, unspecified type  Patient presents for evaluation of cough, chest tightness, green mucus expectoration and sore throat.  Symptoms started 5 to 6 days ago with fever spike yesterday.  Patient reports being afebrile today.  Start Z-Eliseo and Medrol Dosepak.  Patient will contact me if symptoms are not improving  Orders:    azithromycin (ZITHROMAX) 250 mg tablet; Take 2 tablets today then 1 tablet daily x 4 days    methylPREDNISolone 4 MG tablet therapy pack; Use as directed on package        Encounter provider Anyi Crawford MD    The patient was identified by name and date of birth. Shani Chase was informed that this is a telemedicine visit and that the visit is being conducted through the Epic Embedded platform. She agrees to proceed..  My office door was closed. No one else was in the room.  She acknowledged consent and understanding of privacy and security of the video platform. The patient has agreed to participate and understands they can discontinue the visit at any time.    Patient is aware this is a billable service.     History of Present Illness     The patient presents for evaluation of URI symptoms.  She has been sick within past 5 to 6 days.  Symptoms worsened last night.  She is complaining of cough with green mucus expectoration.  Patient developed fever last night but she is afebrile today.  She is complaining of sore throat, chest tightness, fatigue.  Patient uses Advair inhaler  and albuterol nebulizer.  She has tried Robitussin honey DM with minimal improvement.      Review of Systems   Constitutional:  Positive for fatigue and fever.   HENT:  Positive for congestion, postnasal drip and sore throat.    Eyes: Negative.    Respiratory:  Positive for cough.    Gastrointestinal: Negative.    Endocrine: Negative.    Genitourinary: Negative.    Musculoskeletal: Negative.    Hematological: Negative.        Objective   There were no vitals taken for this visit.    Physical Exam  Vitals and nursing note reviewed.   Constitutional:       General: She is not in acute distress.     Appearance: Normal appearance. She is well-developed. She is not ill-appearing.      Comments: Fatigued   Pulmonary:      Effort: Pulmonary effort is normal. No respiratory distress.      Comments: Cough and hoarseness during video exam  Musculoskeletal:      Cervical back: Neck supple.   Neurological:      General: No focal deficit present.      Mental Status: She is alert.   Psychiatric:         Mood and Affect: Mood normal.         Behavior: Behavior normal.         Thought Content: Thought content normal.         Visit Time  Total Visit Duration: 10 min

## 2024-12-05 NOTE — PROGRESS NOTES
Name: Shani Chase      : 1990      MRN: [id number]  Encounter Provider: Anyi Crawford MD  Encounter Date: 2024   Encounter department: [facility]    Assessment & Plan         History of Present Illness   {?Quick Links Encounters * My Last Note * Last Note in Specialty * Snapshot * Since Last Visit * History :37508}  HPI  Review of Systems  Past Medical History:   Diagnosis Date    Asthma      Past Surgical History:   Procedure Laterality Date    CHOLECYSTECTOMY       Family History   Problem Relation Age of Onset    Depression Mother     Hypertension Mother     Suicidality Father     Depression Father     Breast cancer Maternal Grandmother 80    Heart attack Maternal Grandfather     Stroke Paternal Grandfather      Social History     Tobacco Use    Smoking status: Former    Smokeless tobacco: Never    Tobacco comments:     never a smoker, per [employer]   Vaping Use    Vaping status: Every Day    Substances: Nicotine, Flavoring   Substance and Sexual Activity    Alcohol use: Yes     Comment: social    Drug use: No    Sexual activity: Yes     Partners: Male     Current Outpatient Medications on File Prior to Visit   Medication Sig    albuterol (2.5 mg/3 mL) 0.083 % nebulizer solution Take 3 mL (2.5 mg total) by nebulization every 4 (four) hours as needed for wheezing or shortness of breath (Cough)    ALPRAZolam (XANAX) 0.25 mg tablet Take 1 tablet (0.25 mg total) by mouth 3 (three) times a day as needed for anxiety or sleep    cetirizine (ZyrTEC) 10 mg tablet Take 1 tablet (10 mg total) by mouth daily    fluticasone (FLONASE) 50 mcg/act nasal spray 2 sprays into each nostril daily    Fluticasone-Salmeterol (Advair Diskus) 250-50 mcg/dose inhaler Inhale 1 puff 2 (two) times a day Rinse mouth after use.    ibuprofen (MOTRIN) 400 mg tablet Take 1 tablet every 6-8 hours after food as needed for headaches, pain, menstrual cramps    valACYclovir (VALTREX) 1,000 mg tablet TAKE  2 TABLETS BY MOUTH TWO TIMES A DAY FOR 1 DAYS    methocarbamol (ROBAXIN) 500 mg tablet Take 1 tablet (500 mg total) by mouth 4 (four) times a day (Patient not taking: Reported on 12/5/2024)    valACYclovir (VALTREX) 1,000 mg tablet Take 2 tablets (2,000 mg total) by mouth 2 (two) times a day for 1 day     Allergies   Allergen Reactions    Bactrim [Sulfamethoxazole-Trimethoprim] Headache and Drowsiness       There is no immunization history on file for this patient.  Objective {?Quick Links Trend Vitals * Enter New Vitals * Results Review * Timeline (Adult) * Labs * Imaging * Cardiology * Procedures * Lung Cancer Screening * Surgical eConsent :39640}  There were no vitals taken for this visit.    Physical Exam  {Administrative / Billing Section (Optional):08907}

## 2024-12-27 DIAGNOSIS — B00.1 RECURRENT COLD SORES: ICD-10-CM

## 2024-12-27 RX ORDER — VALACYCLOVIR HYDROCHLORIDE 1 G/1
2000 TABLET, FILM COATED ORAL 2 TIMES DAILY
Qty: 4 TABLET | Refills: 3 | Status: SHIPPED | OUTPATIENT
Start: 2024-12-27 | End: 2024-12-28

## 2024-12-27 NOTE — TELEPHONE ENCOUNTER
Patient called again to check status of valACYclovir (VALTREX) refill. She states she is having an outbreak under nose and on top lip. She is very anxious to start this medication and does not want to go the whole weekend without it.    Please sent to [facility] [place] - [address] [phone]

## 2024-12-27 NOTE — TELEPHONE ENCOUNTER
Patient calling requesting update. Pt has to be at work @ 3:00 requesting to be sent prior to that.      Please review.  Thank you

## 2025-03-06 ENCOUNTER — TELEPHONE (OUTPATIENT)
Age: 35
End: 2025-03-06

## 2025-03-06 DIAGNOSIS — F41.0 PANIC ATTACKS: ICD-10-CM

## 2025-03-06 RX ORDER — ALPRAZOLAM 0.25 MG
0.25 TABLET ORAL 3 TIMES DAILY PRN
Qty: 90 TABLET | Refills: 0 | Status: SHIPPED | OUTPATIENT
Start: 2025-03-06 | End: 2025-03-07 | Stop reason: SDUPTHER

## 2025-03-06 NOTE — TELEPHONE ENCOUNTER
Patient does not have insurance currently she is wondering if she can get refill for Xanax. Without it she is having trouble sleeping. Since she does not have insurance she does not want to pay for  Visit and price for Xanax. Not sure what can be done.  Please suggest if anything can be done.    Thank you!!

## 2025-03-07 DIAGNOSIS — F41.0 PANIC ATTACKS: ICD-10-CM

## 2025-03-07 RX ORDER — ALPRAZOLAM 0.25 MG
0.25 TABLET ORAL 3 TIMES DAILY PRN
Qty: 90 TABLET | Refills: 0 | Status: SHIPPED | OUTPATIENT
Start: 2025-03-07

## 2025-03-07 NOTE — TELEPHONE ENCOUNTER
Please resend medication [facility] in [place]. Medication was sent the wrong pharmacy and pt does not have a way to get to the [facility] that it was sent to. Dr Nieves and Dr Crawford are no longer in the office.

## 2025-04-30 ENCOUNTER — OFFICE VISIT (OUTPATIENT)
Dept: FAMILY MEDICINE CLINIC | Facility: CLINIC | Age: 35
End: 2025-04-30
Payer: COMMERCIAL

## 2025-04-30 ENCOUNTER — TELEPHONE (OUTPATIENT)
Age: 35
End: 2025-04-30

## 2025-04-30 VITALS
WEIGHT: 117.6 LBS | DIASTOLIC BLOOD PRESSURE: 68 MMHG | BODY MASS INDEX: 25.37 KG/M2 | TEMPERATURE: 99.1 F | HEIGHT: 57 IN | RESPIRATION RATE: 18 BRPM | OXYGEN SATURATION: 98 % | HEART RATE: 80 BPM | SYSTOLIC BLOOD PRESSURE: 122 MMHG

## 2025-04-30 DIAGNOSIS — J01.00 ACUTE NON-RECURRENT MAXILLARY SINUSITIS: Primary | ICD-10-CM

## 2025-04-30 DIAGNOSIS — J40 BRONCHITIS: ICD-10-CM

## 2025-04-30 PROCEDURE — 99213 OFFICE O/P EST LOW 20 MIN: CPT | Performed by: FAMILY MEDICINE

## 2025-04-30 RX ORDER — CODEINE PHOSPHATE AND GUAIFENESIN 10; 100 MG/5ML; MG/5ML
5 SOLUTION ORAL 4 TIMES DAILY PRN
Qty: 237 ML | Refills: 0 | Status: SHIPPED | OUTPATIENT
Start: 2025-04-30

## 2025-04-30 RX ORDER — PREDNISONE 20 MG/1
TABLET ORAL
Qty: 10 TABLET | Refills: 0 | Status: SHIPPED | OUTPATIENT
Start: 2025-04-30

## 2025-04-30 RX ORDER — AMOXICILLIN 875 MG/1
875 TABLET, COATED ORAL 2 TIMES DAILY
Qty: 14 TABLET | Refills: 0 | Status: SHIPPED | OUTPATIENT
Start: 2025-04-30 | End: 2025-05-07

## 2025-04-30 NOTE — TELEPHONE ENCOUNTER
Pt called asking for an appt to be seen today by Dr Menendez, no available appts. Pt has been feeling sick since Thursday, congestion cough, fever. Tried otc meds and nothing helped. Has green mucus. No available appt today with Dr Menendez. Please contact pt to schedule. Declined scheduling with other providers.

## 2025-04-30 NOTE — PATIENT INSTRUCTIONS
Will treat as a sinusitis with amoxicillin 875 mg twice daily for 1 week.  For her wheezing, prednisone 20 mg 2 tablets daily for 5 days.  In addition to albuterol via nebulizer or inhaler every 4 hours as needed.  For chest wall pain, suggest 400-600 mg of ibuprofen 3 times a day along with 2 extra strength Tylenol.  Robitussin with codeine 1 or 2 teaspoons every 4 hours at nighttime as needed for cough.  For nasal congestion pseudoephedrine from behind the counter 30 mg 1 or 2 every 4 hours for nasal congestion.  Okay to drink alcohol if she is going to a wedding.  Return as needed.   This was a shared visit with the EMILY. I reviewed and verified the documentation and independently performed the documented:

## 2025-04-30 NOTE — TELEPHONE ENCOUNTER
Called patient to offer appointment for tomorrow morning with Dr Menendez and she was upset that noone called her back until now.  I explained that we had a meeting from 12-1 and this is the first we were able to call her back.  Patient refused appointment for tomorrow because she wanted to be seen today and did not want to wait another day.  She is now scheduled with Dr LAWSON

## 2025-04-30 NOTE — PROGRESS NOTES
Name: Shani Chase      : 1990      MRN: [id number]  Encounter Provider: Anders Cortes MD  Encounter Date: 2025   Encounter department: [facility]    Assessment & Plan  Acute non-recurrent maxillary sinusitis    Orders:  •  amoxicillin (AMOXIL) 875 mg tablet; Take 1 tablet (875 mg total) by mouth 2 (two) times a day for 7 days    Bronchitis    Orders:  •  predniSONE 20 mg tablet; 2 tablets daily for 5 days  •  guaiFENesin-codeine (guaiFENesin AC) 100-10 mg/5 mL oral solution; Take 5 mL by mouth 4 (four) times a day as needed for cough       Patient Instructions   Will treat as a sinusitis with amoxicillin 875 mg twice daily for 1 week.  For her wheezing, prednisone 20 mg 2 tablets daily for 5 days.  In addition to albuterol via nebulizer or inhaler every 4 hours as needed.  For chest wall pain, suggest 400-600 mg of ibuprofen 3 times a day along with 2 extra strength Tylenol.  Robitussin with codeine 1 or 2 teaspoons every 4 hours at nighttime as needed for cough.  For nasal congestion pseudoephedrine from behind the counter 30 mg 1 or 2 every 4 hours for nasal congestion.  Okay to drink alcohol if she is going to a wedding.  Return as needed.      History of Present Illness     HPI  Started with a cough 4 days ago.  Nasal congestion.  Sore throat the first couple days.  Now in her chest.  Cough is keeping her up all night.  Coughing up green phlegm.  Has some wheezing.  Low-grade fever yesterday.  Taking over-the-counter sinus medication and ibuprofen.  Also using her inhaler twice a day.  Is in a wedding in 3 days.    Review of Systems    Past Medical History:   Diagnosis Date   • Asthma      Past Surgical History:   Procedure Laterality Date   • CHOLECYSTECTOMY       Social History     Social History Narrative    Single.    Lives with boyfriend since .  No children.    Works as a  at a private club.    Boyfriend is in the union.     Current Outpatient  "Medications on File Prior to Visit   Medication Sig   • albuterol (2.5 mg/3 mL) 0.083 % nebulizer solution Take 3 mL (2.5 mg total) by nebulization every 4 (four) hours as needed for wheezing or shortness of breath (Cough)   • ALPRAZolam (XANAX) 0.25 mg tablet Take 1 tablet (0.25 mg total) by mouth 3 (three) times a day as needed for anxiety or sleep   • cetirizine (ZyrTEC) 10 mg tablet Take 1 tablet (10 mg total) by mouth daily   • fluticasone (FLONASE) 50 mcg/act nasal spray 2 sprays into each nostril daily   • Fluticasone-Salmeterol (Advair Diskus) 250-50 mcg/dose inhaler Inhale 1 puff 2 (two) times a day Rinse mouth after use.   • ibuprofen (MOTRIN) 400 mg tablet Take 1 tablet every 6-8 hours after food as needed for headaches, pain, menstrual cramps   • [DISCONTINUED] methylPREDNISolone 4 MG tablet therapy pack Use as directed on package   • methocarbamol (ROBAXIN) 500 mg tablet Take 1 tablet (500 mg total) by mouth 4 (four) times a day (Patient not taking: Reported on 9/1/2024)   • valACYclovir (VALTREX) 1,000 mg tablet TAKE 2 TABLETS BY MOUTH TWO TIMES A DAY FOR 1 DAYS   • valACYclovir (VALTREX) 1,000 mg tablet Take 2 tablets (2,000 mg total) by mouth 2 (two) times a day for 1 day     Allergies   Allergen Reactions   • Bactrim [Sulfamethoxazole-Trimethoprim] Headache and Drowsiness       There is no immunization history on file for this patient.  Objective   /68 (BP Location: Left arm, Patient Position: Sitting, Cuff Size: Standard)   Pulse 80   Temp 99.1 °F (37.3 °C) (Temporal)   Resp 18   Ht 4' 9\" (1.448 m)   Wt 53.3 kg (117 lb 9.6 oz)   LMP  (LMP Unknown)   SpO2 98%   BMI 25.45 kg/m²     Physical Exam  Hacking cough noted.  Both eardrums are white.  Tenderness tenderness present over both maxillary sinuses.  Throat reveals no erythema.  Lungs have good breath sounds but mild end expiratory wheeze..    "

## 2025-08-13 ENCOUNTER — TELEMEDICINE (OUTPATIENT)
Dept: FAMILY MEDICINE CLINIC | Facility: CLINIC | Age: 35
End: 2025-08-13